# Patient Record
Sex: MALE | NOT HISPANIC OR LATINO | Employment: UNEMPLOYED | URBAN - METROPOLITAN AREA
[De-identification: names, ages, dates, MRNs, and addresses within clinical notes are randomized per-mention and may not be internally consistent; named-entity substitution may affect disease eponyms.]

---

## 2023-10-12 ENCOUNTER — HOSPITAL ENCOUNTER (EMERGENCY)
Facility: HOSPITAL | Age: 53
Discharge: HOME | End: 2023-10-12
Attending: EMERGENCY MEDICINE
Payer: COMMERCIAL

## 2023-10-12 VITALS
RESPIRATION RATE: 18 BRPM | BODY MASS INDEX: 25.9 KG/M2 | TEMPERATURE: 98.3 F | OXYGEN SATURATION: 98 % | SYSTOLIC BLOOD PRESSURE: 149 MMHG | DIASTOLIC BLOOD PRESSURE: 73 MMHG | HEART RATE: 61 BPM | HEIGHT: 72 IN

## 2023-10-12 DIAGNOSIS — K43.2 VENTRAL INCISIONAL HERNIA: Primary | ICD-10-CM

## 2023-10-12 DIAGNOSIS — L98.491: ICD-10-CM

## 2023-10-12 PROCEDURE — 99283 EMERGENCY DEPT VISIT LOW MDM: CPT | Performed by: EMERGENCY MEDICINE

## 2023-10-12 ASSESSMENT — COLUMBIA-SUICIDE SEVERITY RATING SCALE - C-SSRS
6. HAVE YOU EVER DONE ANYTHING, STARTED TO DO ANYTHING, OR PREPARED TO DO ANYTHING TO END YOUR LIFE?: NO
2. HAVE YOU ACTUALLY HAD ANY THOUGHTS OF KILLING YOURSELF?: NO
1. IN THE PAST MONTH, HAVE YOU WISHED YOU WERE DEAD OR WISHED YOU COULD GO TO SLEEP AND NOT WAKE UP?: NO

## 2023-10-12 NOTE — ED TRIAGE NOTES
Pt here for abdominal wound that has opened up and been bleeding. Hx of abd hernia and HBP no thinner.  Wound wrapped by ems.

## 2023-10-12 NOTE — ED PROVIDER NOTES
HPI   Chief Complaint   Patient presents with    Wound Check     Pt here for abdominal wound that has opened up and been bleeding. Hx of abd hernia and HBP no thinner.  Wound wrapped by ems.         53-year-old male past medical history of schizophrenia with complex ventral hernia secondary to multiple surgeries who presents today for bleeding from hernia.  Per patient he was at home sitting in a chair when it began to bleed.  He denies any trauma or picking at anything.  He does endorse that there is some wounds on the hernia sac and he does have occasional abdominal pain, but is not having additional pain right now.  He does state that he does follow with surgery at the [facility], but was declined to get repair by the surgeon he was following with secondary to his tobacco use.  Addition, he does state that he supposed be seeing wound care clinic, who he has not been seeing.  Denies any current IV drug use, fevers, nausea vomiting diarrhea.  Denies any numbness weakness tingling in his arms or legs.  Denies any changes in bowel or bladder.  Denies any new medications or history of bleeding disorders.  He does endorse that he lives in a home and there are multiple steps, which may be part of the reason why his wounds have not been healing.      History provided by:  EMS personnel, patient and relative   used: No                        [place] Coma Scale Score: 15                  Patient History   Past Medical History:   Diagnosis Date    Hernia, abdominal     Hypertension      No past surgical history on file.  No family history on file.  Social History     Tobacco Use    Smoking status: Every Day     Types: Cigarettes    Smokeless tobacco: Not on file   Substance Use Topics    Alcohol use: Not Currently    Drug use: Not on file       Physical Exam   ED Triage Vitals [10/12/23 1506]   Temp Heart Rate Resp BP   36.8 °C (98.3 °F) 61 18 149/73      SpO2 Temp Source Heart Rate Source Patient  Position   98 % Oral Monitor --      BP Location FiO2 (%)     -- --       Physical Exam  Constitutional:       General: He is not in acute distress.     Appearance: Normal appearance. He is not ill-appearing or diaphoretic.   HENT:      Head: Normocephalic and atraumatic.      Mouth/Throat:      Mouth: Mucous membranes are moist.      Pharynx: No oropharyngeal exudate or posterior oropharyngeal erythema.   Eyes:      General: No scleral icterus.     Extraocular Movements: Extraocular movements intact.      Conjunctiva/sclera: Conjunctivae normal.      Pupils: Pupils are equal, round, and reactive to light.   Cardiovascular:      Rate and Rhythm: Normal rate and regular rhythm.      Pulses: Normal pulses.      Heart sounds: Normal heart sounds. No murmur heard.     No gallop.   Pulmonary:      Effort: Pulmonary effort is normal. No respiratory distress.      Breath sounds: Normal breath sounds. No stridor. No wheezing, rhonchi or rales.   Abdominal:      Comments: Large oblong ventral wall hernia with pedunculated outpouching, chronic wounds without any active bleeding.  No erythema or induration, mildly tender to palpation.  At baseline per patient, no additional abdominal tenderness   Musculoskeletal:         General: No swelling, deformity or signs of injury. Normal range of motion.      Cervical back: Normal range of motion and neck supple. No tenderness.   Skin:     General: Skin is warm.      Capillary Refill: Capillary refill takes less than 2 seconds.      Coloration: Skin is not jaundiced or pale.      Findings: No erythema, lesion or rash.   Neurological:      General: No focal deficit present.      Mental Status: He is alert. Mental status is at baseline.   Psychiatric:         Mood and Affect: Mood normal.         Behavior: Behavior normal.         ED Course & MDM   Diagnoses as of 10/12/23 1752   Ventral incisional hernia   Abdominal wall ulcer, limited to breakdown of skin (CMS/[facility])       Medical  Decision Making  53-year-old male past medical history of schizophrenia and multiple abdominal surgeries resulting in complex ventral hernia who presents today for bleeding from hernia.  Patient's wounds were hemostatic upon presentation here.  I did put Surgicel on them and redressed his wound.  We will observe him for rebleeding for an hour.  If patient does not have bleeding from his wounds during that time, we will discharge him home with surgical and wound care clinic follow-ups.  While I do appreciate the risks associated with operating on tobacco users, this hernia had does likely cause a fairly significant amount of disability and he likely needs at the very least a binder and wound care.  Does not appear infected at this time, I do not feel we need to send him with antibiotics.  Patient is hemodynamically stable here and though he does have a decent amount of blood on his clothing, I do not feel that he has lost enough blood to require serial hemoglobins.  We observe the patient for 1 hour and then dressed his wound prior to discharging him home.  I did provide him with follow-up numbers for our general surgery team as well as the wound clinic given that he has had difficulty managing his wound through [facility].    Amount and/or Complexity of Data Reviewed  Independent Historian: caregiver and EMS        Procedure  Procedures     Nikko Tinsley MD  Resident  10/12/23 3347       Nikko Tinsley MD  Resident  10/12/23 4829      Attending Note:     I saw and evaluated the patient.  I personally obtained the key and critical portions of the history and physical exam or was physically present for key and critical portions performed by the resident/fellow/NICOLASA.  I reviewed the resident/fellow/NICOLASA’s documentation and discussed the patient with the resident/fellow/NICOLASA.  I agree with the resident/fellow/NICOLASA’s medical decision making as documented in the resident/fellow/NICOLASA’s note with the exception/addition of the  following:           HPI:   Delta Phoenix is a 53 year old male with history of schizophrenia and ventral hernia presenting to the ED for wound check. The patient reports chronic ventral hernia ongoing for at least 7 years, with chronic wounds to the area ongoing for several years as well.  He states that he follows with a general surgeon, Dr. Young, at [facility], but was told that due to smoking history, he was not offered surgery at that time.  He states that he was told to follow-up with wound clinic but has yet to do so.  Earlier today, he notes that he developed bleeding from his ventral abdominal wound.  He states this has intermittently occurred over the years.  He is not on anticoagulation and denies any trauma or fall.  He denies any erythema, induration, increased warmth, fevers, chills or night sweats. No purulent discharge.  No acute change in the wound otherwise. He is passing stool and flatus without difficulty.     Per chart review, seen 9/2022 and admitted for schizoaffective disorder.  He had a CT A/P 11/2022 which demonstrated extensive ventral hernia with multiple loops of nondilated small bowel.       Additional History Obtained from: See HPI        Physical Exam:  General: Grossly well-developed, awake, alert, NAD     Head: Normocephalic, no overt external signs of trauma     ENT: Mucus membranes moist, nares patent     Neck: Supple, trachea midline     Neurologic: A&Ox4, FS, TM, EOMI, PERRL, REYES x 4 with grossly symmetric strength,SILT grossly intact BUE and BLE     Cardiovascular: RRR, no MRG, pulses grossly symmetric     Respiratory: CTA B/L, no wheeze, rales or rhonchi     Abdomen: Soft, not appreciably tender, no evident rebound/guarding. Large ventral hernia, soft, non-tender to palpation. 3 superficial skin erosions over ventral aspect of the hernia extending partially through the epidermis without evidence of peritoneal involvement, the largest of which appears to be approximately 2x3 cm.  Superficial skin erosion over inferior portion of the hernia, similar in depth and dimensions.  No active bleeding.      Back: No apparent CVA TTP, no midline TLS spine TTP, stepoffs, or acute deformities     MSK: No gross bony deformities in BUE and BLE     Skin/Integumentary: Warm and dry     Psychiatric: Calm and cooperative. Normal mood and affect. No obvious signs of responding to internal stimulation. No pressured speech. No signs of disorganized thought process.  No flight of ideas. No signs of bizarre thought content.         Differential Diagnoses Considered:  See MDM below     Chronic Medical Conditions Significantly Affecting Care:  See MDM below     External Records Reviewed:  I reviewed recent and relevant outside records as noted in HPI above and MDM below.      Social Determinants of Health Significantly Affecting Care:  See HPI/MDM     Prescription Drug Consideration:  See MDM     Diagnostic testing considered:  See MDM and relevant sections        Medical Decision Making/Course:  53 year old male with history of schizophrenia and ventral hernia presenting to the ED for wound check.  The patient was afebrile, non-toxic and HDS.  He has a chronic large ventral hernia with chronic wounds which patient and daughter report have intermittently bleed over the years, and have been present for at least 7 years.  He follows with a surgeon and [facility] and reported that he is supposed to follow-up with wound clinic there.  He denies any acute pain at the site or his hernia site.  He is passing stool and flatus and there are no signs of strangulation or incarceration. The wounds are superficial in nature and do not involve deep tissue or peritoneum.  They do not appear infected at this time.  No signs of cellulitis, abscess, necrotizing fascitis, or other acute infectious etiologies.  He had no active bleeding on my evaluation.  He is not on AC.  He has no signs/symptoms of acute blood loss anemia and has  unremarkable vitals.  He was observed in the ED without any recurrence of bleeding. We discussed further observation, however, patient reported he preferred discharge and outpatient follow-up with his previously established providers.  We provided follow-up numbers for surgery and wound care clinic.  Wound care recommendations discussed.       Strict warning signs/precautions for return to the ED were discussed.  Instructed to follow-up with PCP/FP/IM clinic.  The patient was instructed to follow-up as discussed.  All available ED results from this visit were discussed. Any new prescription medications from the ED were discussed including common side effects/instructions for use. They were also instructed to return to the ED immediately if symptoms worsened, failed to improve, or if they developed any new symptoms/concerns.  An opportunity to ask questions was provided and all were addressed at that time.  The patient verbalized understanding and was in agreement with plan.           Escalation of Care:     Appropriate for: Outpatient management        Discussion of Management with Other Providers:     We discussed the patient/results with: Resident     Yousif Pennington MD  10/13/23 2127       Yousif Pennington MD  10/13/23 2128

## 2024-02-23 ENCOUNTER — APPOINTMENT (OUTPATIENT)
Dept: RADIOLOGY | Facility: HOSPITAL | Age: 54
DRG: 312 | End: 2024-02-23
Payer: MEDICARE

## 2024-02-23 ENCOUNTER — HOSPITAL ENCOUNTER (INPATIENT)
Facility: HOSPITAL | Age: 54
LOS: 5 days | Discharge: PSYCHIATRIC OR SUBSTANCE USE FACILITY | DRG: 312 | End: 2024-02-28
Attending: STUDENT IN AN ORGANIZED HEALTH CARE EDUCATION/TRAINING PROGRAM | Admitting: INTERNAL MEDICINE
Payer: MEDICARE

## 2024-02-23 ENCOUNTER — APPOINTMENT (OUTPATIENT)
Dept: CARDIOLOGY | Facility: HOSPITAL | Age: 54
DRG: 312 | End: 2024-02-23
Payer: MEDICARE

## 2024-02-23 DIAGNOSIS — R55 SYNCOPE, UNSPECIFIED SYNCOPE TYPE: ICD-10-CM

## 2024-02-23 DIAGNOSIS — S01.411A LACERATION OF RIGHT TEMPOROMANDIBULAR AREA WITHOUT FOREIGN BODY, INITIAL ENCOUNTER: ICD-10-CM

## 2024-02-23 DIAGNOSIS — R41.0 DISORIENTATION: ICD-10-CM

## 2024-02-23 DIAGNOSIS — R41.82 ALTERED MENTAL STATUS, UNSPECIFIED ALTERED MENTAL STATUS TYPE: Primary | ICD-10-CM

## 2024-02-23 DIAGNOSIS — J10.1 INFLUENZA A: ICD-10-CM
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
## 2024-02-23 DIAGNOSIS — F17.200 TOBACCO DEPENDENCE: ICD-10-CM

## 2024-02-23 LAB
ALBUMIN SERPL-MCNC: 3.2 G/DL (ref 3.5–5)
ALP BLD-CCNC: 104 U/L (ref 35–125)
ALT SERPL-CCNC: 14 U/L (ref 5–40)
AMMONIA PLAS-SCNC: 25 UMOL/L (ref 12–45)
AMPHETAMINES UR QL SCN>1000 NG/ML: NEGATIVE
ANION GAP SERPL CALC-SCNC: 6 MMOL/L
APPEARANCE UR: CLEAR
APTT PPP: 27.5 SECONDS (ref 22–32.5)
AST SERPL-CCNC: 23 U/L (ref 5–40)
BARBITURATES UR QL SCN>300 NG/ML: NEGATIVE
BASOPHILS # BLD AUTO: 0.02 X10*3/UL (ref 0–0.1)
BASOPHILS NFR BLD AUTO: 0.4 %
BENZODIAZ UR QL SCN>300 NG/ML: NEGATIVE
BILIRUB SERPL-MCNC: 0.2 MG/DL (ref 0.1–1.2)
BILIRUB UR STRIP.AUTO-MCNC: NEGATIVE MG/DL
BUN SERPL-MCNC: 9 MG/DL (ref 8–25)
BZE UR QL SCN>300 NG/ML: NEGATIVE
CALCIUM SERPL-MCNC: 9.7 MG/DL (ref 8.5–10.4)
CANNABINOIDS UR QL SCN>50 NG/ML: NEGATIVE
CHLORIDE SERPL-SCNC: 108 MMOL/L (ref 97–107)
CO2 SERPL-SCNC: 29 MMOL/L (ref 24–31)
COLOR UR: COLORLESS
CREAT SERPL-MCNC: 0.6 MG/DL (ref 0.4–1.6)
EGFRCR SERPLBLD CKD-EPI 2021: >90 ML/MIN/1.73M*2
EOSINOPHIL # BLD AUTO: 0.18 X10*3/UL (ref 0–0.7)
EOSINOPHIL NFR BLD AUTO: 3.6 %
ERYTHROCYTE [DISTWIDTH] IN BLOOD BY AUTOMATED COUNT: 16.8 % (ref 11.5–14.5)
FENTANYL+NORFENTANYL UR QL SCN: NEGATIVE
GLUCOSE BLD MANUAL STRIP-MCNC: 76 MG/DL (ref 74–99)
GLUCOSE SERPL-MCNC: 95 MG/DL (ref 65–99)
GLUCOSE UR STRIP.AUTO-MCNC: NORMAL MG/DL
HCT VFR BLD AUTO: 35.7 % (ref 41–52)
HGB BLD-MCNC: 11.2 G/DL (ref 13.5–17.5)
IMM GRANULOCYTES # BLD AUTO: 0.02 X10*3/UL (ref 0–0.7)
IMM GRANULOCYTES NFR BLD AUTO: 0.4 % (ref 0–0.9)
INR PPP: 1.3 (ref 0.9–1.2)
KETONES UR STRIP.AUTO-MCNC: NEGATIVE MG/DL
LACTATE BLDV-SCNC: 1.4 MMOL/L (ref 0.4–2)
LEUKOCYTE ESTERASE UR QL STRIP.AUTO: NEGATIVE
LYMPHOCYTES # BLD AUTO: 1.54 X10*3/UL (ref 1.2–4.8)
LYMPHOCYTES NFR BLD AUTO: 30.9 %
MAGNESIUM SERPL-MCNC: 2 MG/DL (ref 1.6–3.1)
MCH RBC QN AUTO: 30.4 PG (ref 26–34)
MCHC RBC AUTO-ENTMCNC: 31.4 G/DL (ref 32–36)
MCV RBC AUTO: 97 FL (ref 80–100)
METHADONE UR QL SCN>300 NG/ML: NEGATIVE
MONOCYTES # BLD AUTO: 0.75 X10*3/UL (ref 0.1–1)
MONOCYTES NFR BLD AUTO: 15 %
NEUTROPHILS # BLD AUTO: 2.48 X10*3/UL (ref 1.2–7.7)
NEUTROPHILS NFR BLD AUTO: 49.7 %
NITRITE UR QL STRIP.AUTO: NEGATIVE
NRBC BLD-RTO: 0 /100 WBCS (ref 0–0)
OPIATES UR QL SCN>300 NG/ML: NEGATIVE
OXYCODONE UR QL: NEGATIVE
PCP UR QL SCN>25 NG/ML: NEGATIVE
PH UR STRIP.AUTO: 7.5 [PH]
PLATELET # BLD AUTO: 144 X10*3/UL (ref 150–450)
POTASSIUM SERPL-SCNC: 4.2 MMOL/L (ref 3.4–5.1)
PROT SERPL-MCNC: 6.4 G/DL (ref 5.9–7.9)
PROT UR STRIP.AUTO-MCNC: NEGATIVE MG/DL
PROTHROMBIN TIME: 13.2 SECONDS (ref 9.3–12.7)
RBC # BLD AUTO: 3.69 X10*6/UL (ref 4.5–5.9)
RBC # UR STRIP.AUTO: NEGATIVE /UL
SODIUM SERPL-SCNC: 143 MMOL/L (ref 133–145)
SP GR UR STRIP.AUTO: 1.01
TROPONIN T SERPL-MCNC: 29 NG/L
UROBILINOGEN UR STRIP.AUTO-MCNC: NORMAL MG/DL
VALPROATE SERPL-MCNC: 32 UG/ML (ref 50–100)
WBC # BLD AUTO: 5 X10*3/UL (ref 4.4–11.3)

## 2024-02-23 PROCEDURE — 82140 ASSAY OF AMMONIA: CPT

## 2024-02-23 PROCEDURE — 2500000004 HC RX 250 GENERAL PHARMACY W/ HCPCS (ALT 636 FOR OP/ED)

## 2024-02-23 PROCEDURE — 80053 COMPREHEN METABOLIC PANEL: CPT

## 2024-02-23 PROCEDURE — 2500000004 HC RX 250 GENERAL PHARMACY W/ HCPCS (ALT 636 FOR OP/ED): Performed by: STUDENT IN AN ORGANIZED HEALTH CARE EDUCATION/TRAINING PROGRAM

## 2024-02-23 PROCEDURE — 36415 COLL VENOUS BLD VENIPUNCTURE: CPT

## 2024-02-23 PROCEDURE — 99223 1ST HOSP IP/OBS HIGH 75: CPT

## 2024-02-23 PROCEDURE — 90471 IMMUNIZATION ADMIN: CPT

## 2024-02-23 PROCEDURE — 12011 RPR F/E/E/N/L/M 2.5 CM/<: CPT

## 2024-02-23 PROCEDURE — 81003 URINALYSIS AUTO W/O SCOPE: CPT

## 2024-02-23 PROCEDURE — 71045 X-RAY EXAM CHEST 1 VIEW: CPT

## 2024-02-23 PROCEDURE — 99291 CRITICAL CARE FIRST HOUR: CPT

## 2024-02-23 PROCEDURE — 93005 ELECTROCARDIOGRAM TRACING: CPT

## 2024-02-23 PROCEDURE — 90715 TDAP VACCINE 7 YRS/> IM: CPT

## 2024-02-23 PROCEDURE — 2020000001 HC ICU ROOM DAILY

## 2024-02-23 PROCEDURE — 96372 THER/PROPH/DIAG INJ SC/IM: CPT

## 2024-02-23 PROCEDURE — 2500000005 HC RX 250 GENERAL PHARMACY W/O HCPCS

## 2024-02-23 PROCEDURE — 80164 ASSAY DIPROPYLACETIC ACD TOT: CPT

## 2024-02-23 PROCEDURE — 80307 DRUG TEST PRSMV CHEM ANLYZR: CPT | Performed by: STUDENT IN AN ORGANIZED HEALTH CARE EDUCATION/TRAINING PROGRAM

## 2024-02-23 PROCEDURE — 99222 1ST HOSP IP/OBS MODERATE 55: CPT | Performed by: SURGERY

## 2024-02-23 PROCEDURE — 84484 ASSAY OF TROPONIN QUANT: CPT

## 2024-02-23 PROCEDURE — G0390 TRAUMA RESPONS W/HOSP CRITI: HCPCS

## 2024-02-23 PROCEDURE — 70450 CT HEAD/BRAIN W/O DYE: CPT

## 2024-02-23 PROCEDURE — 83735 ASSAY OF MAGNESIUM: CPT

## 2024-02-23 PROCEDURE — 83605 ASSAY OF LACTIC ACID: CPT

## 2024-02-23 PROCEDURE — 85610 PROTHROMBIN TIME: CPT

## 2024-02-23 PROCEDURE — 85730 THROMBOPLASTIN TIME PARTIAL: CPT

## 2024-02-23 PROCEDURE — 2500000002 HC RX 250 W HCPCS SELF ADMINISTERED DRUGS (ALT 637 FOR MEDICARE OP, ALT 636 FOR OP/ED): Mod: MUE

## 2024-02-23 PROCEDURE — S4991 NICOTINE PATCH NONLEGEND: HCPCS | Performed by: INTERNAL MEDICINE

## 2024-02-23 PROCEDURE — 2500000004 HC RX 250 GENERAL PHARMACY W/ HCPCS (ALT 636 FOR OP/ED): Mod: JW | Performed by: INTERNAL MEDICINE

## 2024-02-23 PROCEDURE — 2500000002 HC RX 250 W HCPCS SELF ADMINISTERED DRUGS (ALT 637 FOR MEDICARE OP, ALT 636 FOR OP/ED): Performed by: INTERNAL MEDICINE

## 2024-02-23 PROCEDURE — 99285 EMERGENCY DEPT VISIT HI MDM: CPT | Mod: 25

## 2024-02-23 PROCEDURE — 82947 ASSAY GLUCOSE BLOOD QUANT: CPT

## 2024-02-23 PROCEDURE — 72125 CT NECK SPINE W/O DYE: CPT

## 2024-02-23 PROCEDURE — 0HQ1XZZ REPAIR FACE SKIN, EXTERNAL APPROACH: ICD-10-PCS

## 2024-02-23 PROCEDURE — 85025 COMPLETE CBC W/AUTO DIFF WBC: CPT

## 2024-02-23 RX ORDER — TALC
6 POWDER (GRAM) TOPICAL NIGHTLY
COMMUNITY

## 2024-02-23 RX ORDER — BENZTROPINE MESYLATE 1 MG/1
1 TABLET ORAL 2 TIMES DAILY
Status: DISCONTINUED | OUTPATIENT
Start: 2024-02-23 | End: 2024-02-24

## 2024-02-23 RX ORDER — ACETAMINOPHEN 325 MG/1
650 TABLET ORAL EVERY 4 HOURS PRN
Status: DISCONTINUED | OUTPATIENT
Start: 2024-02-23 | End: 2024-02-23

## 2024-02-23 RX ORDER — IBUPROFEN 200 MG
1 TABLET ORAL EVERY 24 HOURS
COMMUNITY
End: 2024-02-28 | Stop reason: HOSPADM

## 2024-02-23 RX ORDER — AMLODIPINE BESYLATE 10 MG/1
10 TABLET ORAL DAILY
Status: DISCONTINUED | OUTPATIENT
Start: 2024-02-23 | End: 2024-02-24

## 2024-02-23 RX ORDER — IBUPROFEN 200 MG
1 TABLET ORAL EVERY 24 HOURS
Status: DISCONTINUED | OUTPATIENT
Start: 2024-02-23 | End: 2024-02-24

## 2024-02-23 RX ORDER — METOPROLOL TARTRATE 50 MG/1
50 TABLET ORAL 2 TIMES DAILY
COMMUNITY

## 2024-02-23 RX ORDER — CLOTRIMAZOLE 1 %
1 CREAM (GRAM) TOPICAL 2 TIMES DAILY
Status: DISCONTINUED | OUTPATIENT
Start: 2024-02-23 | End: 2024-02-24

## 2024-02-23 RX ORDER — LABETALOL HYDROCHLORIDE 5 MG/ML
10 INJECTION, SOLUTION INTRAVENOUS EVERY 4 HOURS PRN
Status: DISCONTINUED | OUTPATIENT
Start: 2024-02-23 | End: 2024-02-28 | Stop reason: HOSPADM

## 2024-02-23 RX ORDER — HALOPERIDOL 5 MG/ML
5 INJECTION INTRAMUSCULAR ONCE
Status: COMPLETED | OUTPATIENT
Start: 2024-02-23 | End: 2024-02-23

## 2024-02-23 RX ORDER — ACETAMINOPHEN 160 MG/5ML
650 SOLUTION ORAL EVERY 4 HOURS PRN
Status: DISCONTINUED | OUTPATIENT
Start: 2024-02-23 | End: 2024-02-23

## 2024-02-23 RX ORDER — GUAIFENESIN 600 MG/1
600 TABLET, EXTENDED RELEASE ORAL EVERY 12 HOURS PRN
Status: DISCONTINUED | OUTPATIENT
Start: 2024-02-23 | End: 2024-02-24

## 2024-02-23 RX ORDER — BENZTROPINE MESYLATE 1 MG/1
1 TABLET ORAL 2 TIMES DAILY
COMMUNITY
End: 2024-02-28 | Stop reason: HOSPADM

## 2024-02-23 RX ORDER — HALOPERIDOL 5 MG/ML
5 INJECTION INTRAMUSCULAR EVERY 6 HOURS PRN
Status: DISCONTINUED | OUTPATIENT
Start: 2024-02-23 | End: 2024-02-23

## 2024-02-23 RX ORDER — VALPROIC ACID 250 MG/5ML
1000 SOLUTION ORAL 2 TIMES DAILY
Status: DISCONTINUED | OUTPATIENT
Start: 2024-02-23 | End: 2024-02-24

## 2024-02-23 RX ORDER — CLOTRIMAZOLE 1 %
1 CREAM (GRAM) TOPICAL 2 TIMES DAILY
COMMUNITY
End: 2024-02-28 | Stop reason: HOSPADM

## 2024-02-23 RX ORDER — ACETAMINOPHEN 325 MG/1
975 TABLET ORAL EVERY 6 HOURS PRN
Status: DISCONTINUED | OUTPATIENT
Start: 2024-02-23 | End: 2024-02-28 | Stop reason: HOSPADM

## 2024-02-23 RX ORDER — LIDOCAINE HYDROCHLORIDE AND EPINEPHRINE 10; 10 MG/ML; UG/ML
10 INJECTION, SOLUTION INFILTRATION; PERINEURAL ONCE
Status: COMPLETED | OUTPATIENT
Start: 2024-02-23 | End: 2024-02-23

## 2024-02-23 RX ORDER — VALPROIC ACID 250 MG/5ML
1000 SOLUTION ORAL 2 TIMES DAILY
COMMUNITY
End: 2024-02-28 | Stop reason: HOSPADM

## 2024-02-23 RX ORDER — KETOROLAC TROMETHAMINE 30 MG/ML
30 INJECTION, SOLUTION INTRAMUSCULAR; INTRAVENOUS EVERY 6 HOURS PRN
Status: DISCONTINUED | OUTPATIENT
Start: 2024-02-23 | End: 2024-02-25

## 2024-02-23 RX ORDER — HALOPERIDOL 5 MG/ML
2 INJECTION INTRAMUSCULAR EVERY 4 HOURS PRN
Status: DISCONTINUED | OUTPATIENT
Start: 2024-02-23 | End: 2024-02-23

## 2024-02-23 RX ORDER — RISPERIDONE 1 MG/1
1 TABLET ORAL 2 TIMES DAILY
Status: DISCONTINUED | OUTPATIENT
Start: 2024-02-23 | End: 2024-02-28 | Stop reason: HOSPADM

## 2024-02-23 RX ORDER — ACETAMINOPHEN 650 MG/1
650 SUPPOSITORY RECTAL EVERY 4 HOURS PRN
Status: DISCONTINUED | OUTPATIENT
Start: 2024-02-23 | End: 2024-02-23

## 2024-02-23 RX ORDER — AMLODIPINE BESYLATE 5 MG/1
10 TABLET ORAL DAILY
COMMUNITY

## 2024-02-23 RX ORDER — HALOPERIDOL 5 MG/1
10 TABLET ORAL 2 TIMES DAILY
Status: DISCONTINUED | OUTPATIENT
Start: 2024-02-23 | End: 2024-02-24

## 2024-02-23 RX ORDER — TALC
6 POWDER (GRAM) TOPICAL NIGHTLY
Status: DISCONTINUED | OUTPATIENT
Start: 2024-02-23 | End: 2024-02-24

## 2024-02-23 RX ORDER — DIPHENHYDRAMINE HYDROCHLORIDE 50 MG/ML
25 INJECTION INTRAMUSCULAR; INTRAVENOUS EVERY 6 HOURS PRN
Status: DISCONTINUED | OUTPATIENT
Start: 2024-02-23 | End: 2024-02-28 | Stop reason: HOSPADM

## 2024-02-23 RX ORDER — HALOPERIDOL 10 MG/1
10 TABLET ORAL 2 TIMES DAILY
COMMUNITY

## 2024-02-23 RX ORDER — POLYETHYLENE GLYCOL 3350 17 G/17G
17 POWDER, FOR SOLUTION ORAL DAILY PRN
Status: DISCONTINUED | OUTPATIENT
Start: 2024-02-23 | End: 2024-02-24

## 2024-02-23 RX ORDER — OLANZAPINE 10 MG/2ML
5 INJECTION, POWDER, FOR SOLUTION INTRAMUSCULAR ONCE AS NEEDED
Status: COMPLETED | OUTPATIENT
Start: 2024-02-23 | End: 2024-02-23

## 2024-02-23 RX ORDER — HALOPERIDOL 5 MG/ML
10 INJECTION INTRAMUSCULAR 2 TIMES DAILY
Status: DISCONTINUED | OUTPATIENT
Start: 2024-02-23 | End: 2024-02-28 | Stop reason: HOSPADM

## 2024-02-23 RX ORDER — GUAIFENESIN/DEXTROMETHORPHAN 100-10MG/5
5 SYRUP ORAL EVERY 4 HOURS PRN
Status: DISCONTINUED | OUTPATIENT
Start: 2024-02-23 | End: 2024-02-24

## 2024-02-23 RX ORDER — DIPHENHYDRAMINE HYDROCHLORIDE 50 MG/ML
25 INJECTION INTRAMUSCULAR; INTRAVENOUS ONCE
Status: COMPLETED | OUTPATIENT
Start: 2024-02-23 | End: 2024-02-23

## 2024-02-23 RX ORDER — METOPROLOL TARTRATE 50 MG/1
50 TABLET ORAL 2 TIMES DAILY
Status: DISCONTINUED | OUTPATIENT
Start: 2024-02-23 | End: 2024-02-24

## 2024-02-23 RX ADMIN — HALOPERIDOL LACTATE 5 MG: 5 INJECTION, SOLUTION INTRAMUSCULAR at 16:13

## 2024-02-23 RX ADMIN — OLANZAPINE 5 MG: 10 INJECTION, POWDER, FOR SOLUTION INTRAMUSCULAR at 14:04

## 2024-02-23 RX ADMIN — HALOPERIDOL LACTATE 10 MG: 5 INJECTION, SOLUTION INTRAMUSCULAR at 21:02

## 2024-02-23 RX ADMIN — KETOROLAC TROMETHAMINE 30 MG: 30 INJECTION, SOLUTION INTRAMUSCULAR; INTRAVENOUS at 18:22

## 2024-02-23 RX ADMIN — HALOPERIDOL LACTATE 2 MG: 5 INJECTION, SOLUTION INTRAMUSCULAR at 17:06

## 2024-02-23 RX ADMIN — DIPHENHYDRAMINE HYDROCHLORIDE 25 MG: 50 INJECTION, SOLUTION INTRAMUSCULAR; INTRAVENOUS at 12:08

## 2024-02-23 RX ADMIN — LIDOCAINE HYDROCHLORIDE,EPINEPHRINE BITARTRATE 10 ML: 10; .01 INJECTION, SOLUTION INFILTRATION; PERINEURAL at 10:02

## 2024-02-23 RX ADMIN — HALOPERIDOL LACTATE 5 MG: 5 INJECTION, SOLUTION INTRAMUSCULAR at 12:08

## 2024-02-23 RX ADMIN — TETANUS TOXOID, REDUCED DIPHTHERIA TOXOID AND ACELLULAR PERTUSSIS VACCINE, ADSORBED 0.5 ML: 5; 2.5; 8; 8; 2.5 SUSPENSION INTRAMUSCULAR at 10:02

## 2024-02-23 RX ADMIN — RISPERIDONE 1 MG: 1 TABLET, FILM COATED ORAL at 21:04

## 2024-02-23 RX ADMIN — NICOTINE 1 PATCH: 21 PATCH, EXTENDED RELEASE TRANSDERMAL at 14:01

## 2024-02-23 RX ADMIN — DEXTROSE MONOHYDRATE 500 MG: 50 INJECTION, SOLUTION INTRAVENOUS at 21:03

## 2024-02-23 ASSESSMENT — ENCOUNTER SYMPTOMS
NEUROLOGICAL NEGATIVE: 1
HEMATOLOGIC/LYMPHATIC NEGATIVE: 1
EYES NEGATIVE: 1
MUSCULOSKELETAL NEGATIVE: 1
CONSTITUTIONAL NEGATIVE: 1
ENDOCRINE NEGATIVE: 1
CARDIOVASCULAR NEGATIVE: 1
PSYCHIATRIC NEGATIVE: 1
GASTROINTESTINAL NEGATIVE: 1
RESPIRATORY NEGATIVE: 1

## 2024-02-23 ASSESSMENT — PAIN - FUNCTIONAL ASSESSMENT
PAIN_FUNCTIONAL_ASSESSMENT: FLACC (FACE, LEGS, ACTIVITY, CRY, CONSOLABILITY)
PAIN_FUNCTIONAL_ASSESSMENT: FLACC (FACE, LEGS, ACTIVITY, CRY, CONSOLABILITY)
PAIN_FUNCTIONAL_ASSESSMENT: UNABLE TO SELF-REPORT
PAIN_FUNCTIONAL_ASSESSMENT: 0-10
PAIN_FUNCTIONAL_ASSESSMENT: UNABLE TO SELF-REPORT
PAIN_FUNCTIONAL_ASSESSMENT: 0-10

## 2024-02-23 ASSESSMENT — COGNITIVE AND FUNCTIONAL STATUS - GENERAL
PATIENT BASELINE BEDBOUND: NO
DAILY ACTIVITIY SCORE: 24
MOBILITY SCORE: 24

## 2024-02-23 ASSESSMENT — PAIN SCALES - GENERAL
PAINLEVEL_OUTOF10: 10 - WORST POSSIBLE PAIN
PAINLEVEL_OUTOF10: 0 - NO PAIN
PAINLEVEL_OUTOF10: 0 - NO PAIN
PAINLEVEL_OUTOF10: 2

## 2024-02-23 ASSESSMENT — ACTIVITIES OF DAILY LIVING (ADL)
JUDGMENT_ADEQUATE_SAFELY_COMPLETE_DAILY_ACTIVITIES: NO
ASSISTIVE_DEVICE: OTHER (COMMENT)
PATIENT'S MEMORY ADEQUATE TO SAFELY COMPLETE DAILY ACTIVITIES?: UNABLE TO ASSESS
FEEDING YOURSELF: UNABLE TO ASSESS
GROOMING: UNABLE TO ASSESS
WALKS IN HOME: UNABLE TO ASSESS
TOILETING: UNABLE TO ASSESS
HEARING - LEFT EAR: FUNCTIONAL
ADEQUATE_TO_COMPLETE_ADL: YES
DRESSING YOURSELF: UNABLE TO ASSESS
BATHING: UNABLE TO ASSESS
HEARING - RIGHT EAR: FUNCTIONAL

## 2024-02-23 ASSESSMENT — PAIN DESCRIPTION - LOCATION: LOCATION: BACK

## 2024-02-23 ASSESSMENT — LIFESTYLE VARIABLES
EVER FELT BAD OR GUILTY ABOUT YOUR DRINKING: NO
HAVE PEOPLE ANNOYED YOU BY CRITICIZING YOUR DRINKING: NO
EVER HAD A DRINK FIRST THING IN THE MORNING TO STEADY YOUR NERVES TO GET RID OF A HANGOVER: NO
HAVE YOU EVER FELT YOU SHOULD CUT DOWN ON YOUR DRINKING: NO

## 2024-02-23 NOTE — CONSULTS
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 0.0 0.0 - 0.0 /100 WBCs    RBC 3.69 (L) 4.50 - 5.90 x10*6/uL    Hemoglobin 11.2 (L) 13.5 - 17.5 g/dL    Hematocrit 35.7 (L) 41.0 - 52.0 %    MCV 97 80 - 100 fL    MCH 30.4 26.0 - 34.0 pg    MCHC 31.4 (L) 32.0 - 36.0 g/dL    RDW 16.8 (H) 11.5 - 14.5 %    Platelets 144 (L) 150 - 450 x10*3/uL    Neutrophils % 49.7 40.0 - 80.0 %    Immature Granulocytes %, Automated 0.4 0.0 - 0.9 %    Lymphocytes % 30.9 13.0 - 44.0 %    Monocytes % 15.0 2.0 - 10.0 %    Eosinophils % 3.6 0.0 - 6.0 %    Basophils % 0.4 0.0 - 2.0 %    Neutrophils Absolute 2.48 1.20 - 7.70 x10*3/uL    Immature Granulocytes Absolute, Automated 0.02 0.00 - 0.70 x10*3/uL    Lymphocytes Absolute 1.54 1.20 - 4.80 x10*3/uL    Monocytes Absolute 0.75 0.10 - 1.00 x10*3/uL    Eosinophils Absolute 0.18 0.00 - 0.70 x10*3/uL    Basophils Absolute 0.02 0.00 - 0.10 x10*3/uL   Comprehensive metabolic panel   Result Value Ref Range    Glucose 95 65 - 99 mg/dL    Sodium 143 133 - 145 mmol/L    Potassium 4.2 3.4 - 5.1 mmol/L    Chloride 108 (H) 97 - 107 mmol/L    Bicarbonate 29 24 - 31 mmol/L    Urea Nitrogen 9 8 - 25 mg/dL    Creatinine 0.60 0.40 - 1.60 mg/dL    eGFR >90 >60 mL/min/1.73m*2    Calcium 9.7 8.5 - 10.4 mg/dL    Albumin 3.2 (L) 3.5 - 5.0 g/dL    Alkaline Phosphatase 104 35 - 125 U/L    Total Protein 6.4 5.9 - 7.9 g/dL    AST 23 5 - 40  U/L    Bilirubin, Total 0.2 0.1 - 1.2 mg/dL    ALT 14 5 - 40 U/L    Anion Gap 6 <=19 mmol/L   Magnesium   Result Value Ref Range    Magnesium 2.00 1.60 - 3.10 mg/dL   BLOOD GAS LACTIC ACID, VENOUS   Result Value Ref Range    POCT Lactate, Venous 1.4 0.4 - 2.0 mmol/L   ECG 12 lead   Result Value Ref Range    Ventricular Rate 59 BPM    Atrial Rate 59 BPM    AL Interval 132 ms    QRS Duration 82 ms    QT Interval 404 ms    QTC Calculation(Bazett) 399 ms    P Axis 51 degrees    R Axis 60 degrees    T Axis 17 degrees    QRS Count 10 beats    Q Onset 221 ms    P Onset 155 ms    P Offset 207 ms    T Offset 423 ms    QTC Fredericia 401 ms   Urinalysis with Reflex Culture and Microscopic   Result Value Ref Range    Color, Urine Colorless (N) Light-Yellow, Yellow, Dark-Yellow    Appearance, Urine Clear Clear    Specific Gravity, Urine 1.007 1.005 - 1.035    pH, Urine 7.5 5.0, 5.5, 6.0, 6.5, 7.0, 7.5, 8.0    Protein, Urine NEGATIVE NEGATIVE, 10 (TRACE), 20 (TRACE) mg/dL    Glucose, Urine Normal Normal mg/dL    Blood, Urine NEGATIVE NEGATIVE    Ketones, Urine NEGATIVE NEGATIVE mg/dL    Bilirubin, Urine NEGATIVE NEGATIVE    Urobilinogen, Urine Normal Normal mg/dL    Nitrite, Urine NEGATIVE NEGATIVE    Leukocyte Esterase, Urine NEGATIVE NEGATIVE   Drug Screen, Urine   Result Value Ref Range    Amphetamine Screen, Urine Negative      Barbiturate Screen, Urine Negative      Benzodiazepines Screen, Urine Negative      Cannabinoid Screen, Urine Negative      Cocaine Metabolite Screen, Urine Negative      Fentanyl Screen, Urine Negative       Methadone Screen, Urine Negative      Opiate Screen, Urine Negative      Oxycodone Screen, Urine Negative      PCP Screen, Urine Negative     Ammonia   Result Value Ref Range    Ammonia 25 12 - 45 umol/L   aPTT   Result Value Ref Range    aPTT 27.5 22.0 - 32.5 seconds   Protime-INR   Result Value Ref Range    Protime 13.2 (H) 9.3 - 12.7 seconds    INR 1.3 (H) 0.9 - 1.2      Lab Results    Component Value Date    HGBA1C 5.7 (H) 02/08/2024      ECG 12 lead    Result Date: 2/23/2024  Sinus bradycardia Possible Left atrial enlargement Borderline ECG When compared with ECG of 22-JUL-2023 19:50, Previous ECG has undetermined rhythm, needs review T wave inversion now evident in Anterior leads    CT cervical spine wo IV contrast    Result Date: 2/23/2024  Interpreted By:  Tristin Hernandez, STUDY: CT CERVICAL SPINE WO IV CONTRAST; 2/23/2024 8:57 am   INDICATION: Signs/Symptoms:fall hit head;   COMPARISON: None   ACCESSION NUMBER(S): [id number]   ORDERING CLINICIAN: ADRIANA HERNANDEZ   TECHNIQUE: Contiguous axial images were acquired from the skull base to the lung apices. Coronal and sagittal reformatted images were obtained. All CT examinations are performed with 1 or more of the following dose reduction techniques: Automated exposure control, adjustment of mA and/or kv according to patient's size, or use of iterative reconstruction techniques.   FINDINGS: There is straightening of the normal cervical lordosis.  No acute fracture or spondylolisthesis is identified. Facet degenerative changes and uncovertebral joint degenerative changes are present.     The occipital condyles, arch of C1, and the odontoid processes are intact. The atlantoaxial relationship is well maintained.   Mild-moderate disc space narrowing at C2-3 C3-4 and C4-5. Moderate-severe disc space narrowing at C5-6 and C6-7. No evidence for high-grade bony spinal canal stenosis. However there is multilevel high-grade bilateral neural foramina stenosis.         1. No acute fracture or spondylolisthesis. 2. Degenerative disc disease and spondylosis as described in the body of the report.     Signed by: Tristin Hernandez 2/23/2024 9:32 AM Dictation workstation:   [id number]    CT head wo IV contrast    Result Date: 2/23/2024  Interpreted By:  Tristin Hernandez, STUDY: ADRIANA HERNANDEZ; 2/23/2024 8:57 am   INDICATION: Signs/Symptoms:fall altered;    COMPARISON: None   ACCESSION NUMBER(S): [id number]   ORDERING CLINICIAN: ADRIANA HERNANDEZ   TECHNIQUE: Contiguous axial images were acquired from the vertex through the posterior fossa without IV contrast. All CT examinations are performed with 1 or more of the following dose reduction techniques: Automated exposure control, adjustment of mA and/or kv according to patient's size, or use of iterative reconstruction techniques.   FINDINGS: Small subcutaneous hematoma in the right periorbital location without underlying bony abnormality. No focal mass effect or midline shift is identified. The ventricles and sulci are symmetric and appropriate for the patient's age.   Mild-moderate degree of nonspecific white matter change most consistent with chronic small-vessel ischemic disease the gray white matter differentiation is preserved.   No acute intracranial hemorrhage is seen. No intra-axial or extra-axial fluid collection is seen.   The visualized paranasal sinuses and mastoid air cells are clear.       No CT evidence for acute intracranial pathology.   Signed by: Tristin Hernandez 2/23/2024 9:30 AM Dictation workstation:   [id number]    XR chest 1 view    Result Date: 2/23/2024  Interpreted By:  Tristin Hernandez, STUDY: XR CHEST 1 VIEW; 2/23/2024 9:08 am   INDICATION: Signs/Symptoms:fall.   COMPARISON: 02/18/2022   ACCESSION NUMBER(S): [id number]   ORDERING CLINICIAN: ADRIANA HERNANDEZ   TECHNIQUE: 1 view of the chest was performed.   FINDINGS: The lungs are adequately inflated. No acute consolidation. No pleural effusion. No pneumothorax.  The cardiomediastinal silhouette is within normal limits.       No acute cardiopulmonary disease.   Signed by: Tristin Hernandez 2/23/2024 9:25 AM Dictation workstation:   [id number]    XR abdomen 1 view    Result Date: 2/14/2024  * * *Final Report* * * DATE OF EXAM: Feb 14 2024  9:22AM   S5X   5289  -  XR ABDOMEN 1V SUPINE  / ACCESSION #  [id number] PROCEDURE REASON: abd pain      * *  * * Physician Interpretation * * * *  ABDOMEN X-RAY TECHNIQUE: Supine view, (2 images) COMPARISON: 01/30/2024 INDICATION:  Abdominal pain RESULT: No dilated bowel loops.  Gas and appropriate volume of formed stool are seen throughout normal caliber colon to the rectum.  Metallic clips, coils, and sutures project over the abdomen. -    IMPRESSION: Nonobstructive bowel gas pattern. : [facility]   Transcribe Date/Time: Feb 14 2024  9:32A Dictated by : MACIEL FLORES MD This examination was interpreted and the report reviewed and electronically signed by: MACIEL FLORES MD on Feb 14 2024  9:33AM  EST    XR abdomen 2 views supine and erect or decub    Result Date: 2/14/2024  * * *Final Report* * * DATE OF EXAM: Feb 14 2024  9:22AM   S5X   5289  -  XR ABDOMEN 1V SUPINE  / ACCESSION #  [id number] PROCEDURE REASON: abd pain      * * * * Physician Interpretation * * * *  ABDOMEN X-RAY TECHNIQUE: Supine view, (2 images) COMPARISON: 01/30/2024 INDICATION:  Abdominal pain RESULT: No dilated bowel loops.  Gas and appropriate volume of formed stool are seen throughout normal caliber colon to the rectum.  Metallic clips, coils, and sutures project over the abdomen. -    IMPRESSION: Nonobstructive bowel gas pattern. : [facility]   Transcribe Date/Time: Feb 14 2024  9:32A Dictated by : MACIEL FLORES MD This examination was interpreted and the report reviewed and electronically signed by: MACIEL FLORES MD on Feb 14 2024  9:33AM  EST    XR chest 1 view    Result Date: 1/30/2024  * * *Final Report* * * DATE OF EXAM: Jan 30 2024 12:16PM   [facility]   [id number]  -  XR CHEST 1V FRONTAL PORT  / ACCESSION #  [id number] PROCEDURE REASON: Pulmonary Edema      * * * * Physician Interpretation * * * *  EXAMINATION:  CHEST RADIOGRAPH (PORTABLE SINGLE VIEW AP) Exam Date/Time:  1/30/2024 12:16 PM Clinical History: Pulmonary Edema MQ:  XCPMC_6 Comparison:  2 days prior RESULT: Lines, tubes, and devices:  Interval removal of nasogastric tube.   LEFT PICC terminates at the superior cavoatrial junction. Lungs and pleura:  Lung volumes remain low.  Mild interstitial opacities are in both lungs, LEFT worse than RIGHT with bibasilar atelectasis and trace LEFT pleural effusion.  No substantial pneumothorax is identified. Cardiomediastinal silhouette:  The cardiomediastinal silhouette is stable since the prior exam. Other:    IMPRESSION: See result : [facility]   Transcribe Date/Time: Jan 30 2024 12:29P Dictated by : JUSTIN SNYDER MD This examination was interpreted and the report reviewed and electronically signed by: JUSTIN SNYDER MD on Jan 30 2024 12:30PM  EST    XR abdomen 2 views supine and erect or decub    Result Date: 1/30/2024  * * *Final Report* * * DATE OF EXAM: Jan 30 2024  5:37AM   [facility]   5289  -  XR ABDOMEN 1V SUPINE  / ACCESSION #  [id number] PROCEDURE REASON: Ileus vs obstruction      * * * * Physician Interpretation * * * *  PORTABLE SUPINE ABDOMEN RADIOGRAPH  1/30/2024 5:37 AM HISTORY: Ileus TECHNIQUE:  2 supine abdominal image(s).    IMPRESSION: Scattered gas in nondilated colon and small bowel.   Herniorrhaphy mesh overlies the left abdomen.  Rectal management device in lower midline pelvis.  Nasogastric tube has been removed. : [facility]   Transcribe Date/Time: Jan 30 2024  7:49A Dictated by : AIDEE OSBORNE MD This examination was interpreted and the report reviewed and electronically signed by: AIDEE OSBORNE MD on Jan 30 2024  7:57AM  EST    XR abdomen 2 views supine and erect or decub    Result Date: 1/29/2024  * * *Final Report* * * DATE OF EXAM: Jan 29 2024  6:38AM   [facility]   5289  -  XR ABDOMEN 1V SUPINE  / ACCESSION #  [id number] PROCEDURE REASON: Bowel obstruction suspected      * * * * Physician Interpretation * * * *  ABDOMEN PORTABLE 01/29/2024 6:22 AM HISTORY: Bowel obstruction suspected. TECHNIQUE: Single View, Supine Abdomen; Number of Images: 1 with additional postprocessing COMPARISON: KUB 01/28/2024 RESULT: See  impression.    IMPRESSION: NG/OG tube terminating in the gastric body, side port below the level of the diaphragm.  Gas in nondilated loops of both small and large bowel.   Herniorrhaphy mesh tacks over the LEFT abdomen.  Midline and right lateral abdominal abdominal surgical clips. : [facility]   Transcribe Date/Time: Jan 29 2024  7:30A Dictated by : CRISTIANE DURAN This examination was interpreted and the report reviewed and electronically signed by: KATHY JACK MD on Jan 29 2024  7:34AM  EST    XR chest 1 view    Result Date: 1/28/2024  * * *Final Report* * * DATE OF EXAM: Jan 28 2024  5:00PM   [facility]   5376  -  XR CHEST 1V FRONTAL PORT  / ACCESSION #  [id number] PROCEDURE REASON: Cough      * * * * Physician Interpretation * * * *  EXAMINATION:  CHEST RADIOGRAPH (PORTABLE SINGLE VIEW AP) Exam Date/Time:  1/28/2024 5:00 PM Clinical History: Cough, Shortness of breath MQ:  XCPMC_6 Comparison:  1 day prior RESULT: Lines, tubes, and devices:  The tip of a left PICC terminates likely in the right atrium.  An NG/OG tube extends below the diaphragm.  The tip of an endotracheal tube is obscured although appears to be above the petr. Lungs and pleura:  Stable right hemidiaphragm elevation.  There is likely mild atelectasis at the lung bases.  There may be small pleural effusions.  The left lung apex is partially obscured by an overlying device.  No pulmonary edema or substantial pneumothorax. Cardiomediastinal silhouette:  Stable cardiomediastinal silhouette. Other:  .    IMPRESSION: See result. : [facility]   Transcribe Date/Time: Jan 28 2024  6:00P Dictated by : VERONICA MORIN MD This examination was interpreted and the report reviewed and electronically signed by: VERONICA MORIN MD on Jan 28 2024  6:02PM  EST    XR abdomen 2 views supine and erect or decub    Result Date: 1/28/2024  * * *Final Report* * * DATE OF EXAM: Jan 28 2024 10:38AM   [facility]   5289  -  XR ABDOMEN 1V SUPINE  / ACCESSION #   [id number] PROCEDURE REASON: Abdominal distension      * * * * Physician Interpretation * * * *  PLAIN FILM OF THE ABDOMEN CLINICAL INFORMATION: Abdominal distension. DATE: 1/28/2024 at 10:29 AM TECHNIQUE: Supine  abdomen, 1 view(s); 2 images RESULT: see impression.    IMPRESSION: NG/OG tube with tip and side-port in the gastric body/antrum. No gas-filled dilated bowel loops. : [facility]   Transcribe Date/Time: Jan 28 2024  2:31P Dictated by : BERNICE ESQUIVEL MD This examination was interpreted and the report reviewed and electronically signed by: BERNICE ESQUIVEL MD on Jan 28 2024  2:33PM  EST    XR chest 1 view    Result Date: 1/27/2024  * * *Final Report* * * DATE OF EXAM: Jan 27 2024  3:50PM   [facility]   [id number]  -  XR CHEST 1V FRONTAL PORT  / ACCESSION #  [id number] PROCEDURE REASON: Hypoxemia      * * * * Physician Interpretation * * * *  EXAMINATION:  CHEST RADIOGRAPH (PORTABLE SINGLE VIEW AP) Exam Date/Time:  1/27/2024 3:50 PM Clinical History: Hypoxemia MQ:  XCPMC_6 Comparison:  2 days prior RESULT: Lines, tubes, and devices:  The malpositioned right PICC present on the prior exam has been removed.  Interval placement of a left PICC with the tip near the superior cavoatrial junction.  The tip of an endotracheal tube is above the petr.  An NG/OG tube extends below the diaphragm. Lungs and pleura:  Improved inspiration.  Hazy opacities at the lung bases are probably due to small pleural effusions with adjacent atelectasis although superimposed pneumonia/aspiration is possible.   Bilateral reticular opacities, left greater than right, are suggestive of vascular congestion/mild pulmonary edema.  No substantial pneumothorax. Cardiomediastinal silhouette:  Stable borderline enlarged cardiomediastinal silhouette. Other:  .    IMPRESSION: See result. : [employer]   Transcribe Date/Time: Jan 27 2024  3:53P Dictated by : VERONICA MORIN MD This examination was interpreted and the report reviewed and  electronically signed by: VERONICA MORIN MD on Jan 27 2024  3:56PM  EST    XR abdomen 2 views supine and erect or decub    Result Date: 1/27/2024  * * *Final Report* * * DATE OF EXAM: Jan 27 2024  7:03AM   [facility]   [id number]  -  XR ABDOMEN 1V SUPINE  / ACCESSION #  [id number] PROCEDURE REASON: Abdominal distension      * * * * Physician Interpretation * * * *  EXAM: KUB ABDOMEN. CLINICAL INFORMATION: Abdominal distention TECHNIQUE: Supine abdomen, 2 image(s) COMPARISON: 01/25/2024 RESULT:  See Impression.    IMPRESSION: Support lines and tubes:  Nasogastric tube, with its tip in the midgastric body, its sidehole is subdiaphragmatic. Bowel gas pattern: Gas-filled normal-caliber small and large bowel loops. Calcifications: Hernia repair tacks present in the midabdomen.  Midline surgical clips Free Gas: cannot be assessed on this supine exam. : [facility]   Transcribe Date/Time: Jan 27 2024  8:45A Dictated by : TRAVIS RYAN MD This examination was interpreted and the report reviewed and electronically signed by: TRAVIS RYAN MD on Jan 27 2024  8:47AM  EST    IR PHYSICIAN PICC INSERTION RADIO    Result Date: 1/26/2024  * * *Final Report* * * DATE OF EXAM: Jan 26 2024 12:03PM   [facility]   [id number]  -  IR PICC INSERT PHYSICIAN  / ACCESSION #  [id number] PROCEDURE REASON: Difficult intravenous access      * * * * Physician Interpretation * * * *  PROCEDURE: PERIPHERALLY INSERTED CENTRAL CATHETER (PICC) PLACEMENT Procedural Personnel Attending physician(s): Aspen Martinez MD Fellow physician(s): None Resident physician(s): None Advanced practice provider(s): CRISTIANE Valentin Medical Student(s): None Pre-procedure diagnosis: Double-lumen PICC line insertion Post-procedure diagnosis: Same Indication: TPN/additional IV access Additional clinical history: 54 y/o male with history of HTN, schizophrenia presenting?with ulceration of abdominal skin progressing to evisceration of bowel.  He presents to IR for failed right  basilic/brachial vein PICC line insertion per PICC team.  Double-lumen PICC line insertion requested for administration of TPN as well as additional IV access. _______________________________________________________________ PROCEDURE SUMMARY: - Venous access with ultrasound guidance - PICC insertion with fluoroscopic guidance - Additional procedure(s): None PROCEDURE DETAILS: Pre-procedure Consent: Risks, benefits, treatment options, potential complications and personnel to be involved were discussed (including the risks of radiation exposure, contrast and anesthesia administration, and any equipment needed for the procedure to ensure best possible outcome) with the patient and all questions were answered and consent was obtained prior to procedure. Premedicated for contrast allergy: n/a Transfusion of blood products: No Medication reconciliation: The patient's medications and allergies were reviewed in the electronic medical record and reconciled to the proposed procedure/treatment. Vira-procedure discussion: The appropriate elements of the pre-procedure discussion, safety check list and sign-out were performed. Time out: A time out was performed immediately prior to procedure start with the nursing and interventional team, correctly identifying the name, date of birth, procedure, anatomy (including marking of site and side if applicable), patient position, procedure consent form, relevant diagnostic and radiology test results, antibiotic administration if applicable, safety precautions, and procedure-specific equipment needs. Start of procedure: 1139 End of procedure: 1203 Patient position:  Supine Preparation (MIPS): The site was prepared and draped using all elements of maximal sterile barrier technique including sterile gloves, sterile gown, cap, mask, large sterile sheet, sterile ultrasound probe cover, hand hygiene and cutaneous antisepsis with 2% chlorhexidine. Medical reason for site preparation exception  (MIPS): Not applicable Antibiotics: None Antibiotic infusion start time: N/A Prophylactic antibiotic administered: None Additional med:  None Additional med:  None Contrast Contrast agent: None Contrast volume (mL): 0 Image Guidance:  Fluoroscopic and sonographic guidance with digital image storage Radiation Dose FLUOROSCOPIC RADIATION SUMMARY: Plane A, Air Kerma:  5.8mGy Dose Area Product (DAP):   151.30vLno4 Fluoro Time:  3.0 min:sec Radiation dose exceed 5 Gy: No If radiation dose exceeded 5 Gy, was counseling and instructional brochure provided:  N/A Anesthesia/sedation Level of anesthesia/sedation: No sedation Anesthesia/sedation administered by: Not applicable Total intra-service sedation time (minutes): 0 Local anesthesia: 2 % lidocaine Access Local anesthesia was administered. The vessel was sonographically evaluated and determined to be patent. Real time ultrasound was used to visualize needle entry into the vessel and a permanent image was stored. Vein accessed: Basilic vein Access technique: Micropuncture set with 21 gauge needle Venography Indication for venography: None Vein catheterized: Not applicable Findings: Not applicable Catheter placement The catheter was trimmed to appropriate length and placed into the vein under fluoroscopic guidance via a peel-away sheath. Catheter tip location was fluoroscopically verified and a permanent image was stored. A sterile dressing was applied. Catheter placed: 2L Power PICC-line Catheter size (Swazi): 5 Catheter intravascular length (cm): 51 Catheter flush: Normal saline Catheter securement technique: Stat-Lock Additional Details Additional description of procedure: None Equipment details: None Specimens removed: None Estimated blood loss (mL): Less than 10 Standardized report: [id number] Complications There were no immediate complications and no other complications. Conclusion The patient was comfortable and was transferred to the regular nursing floor in  stable condition. The procedure was performed by the: the assistant, and the attending radiologist was present for all critical and key portions of the procedure, and was immediately available to furnish services during the entire procedure. The attending radiologist performed the following procedural activities: Preprocedural planning.    IMPRESSION: INSERTION OF LEFT-SIDED DUAL-LUMEN POWER-INJECTABLE PICC, WITH TIP IN THE EXPECTED LOCATION OF THE RIGHT ATRIUM. Plan: The catheter may be used immediately. Attestation Signer name: Aspen Martinez MD I attest that I was present for the entire procedure. I reviewed the stored images and agree with the report as written. : [facility]   Transcribe Date/Time: Jan 26 2024 12:18P Dictated by : RT SKINNY This examination was interpreted and the report reviewed and electronically signed by: ASPEN MARTINEZ MD on Jan 26 2024  1:32PM  EST    XR chest 1 view    Result Date: 1/25/2024  * * *Final Report* * * DATE OF EXAM: Jan 25 2024  6:17PM   [facility]   [id number]  -  XR CHEST 1V PORT POST PICC -NB  / ACCESSION #  [id number] PROCEDURE REASON: Check PICC line placement      * * * * Physician Interpretation * * * *  EXAMINATION:  CHEST RADIOGRAPH (PORTABLE SINGLE VIEW AP) CLINICAL HISTORY:  Check tip position following upper extremity catheter placement  Check PICC line placement MQ:  XCPICC_3 COMPARISON:  Same day RESULT: Lines, tubes, and devices:      - Interval placement of a right upper extremity catheter; the distal aspect of the line is misdirected to the right IJ.  The actual tip is not excluded.  Repositioning is recommended.      - Other lines/tubes/devices:  ET tube postmortem satisfactory position.  NG tube remains with tip in the mid stomach. Lungs and pleura:  Increase of basilar atelectasis, left more than right.  Superimposed infiltrates/infection or edema cannot be entirely excluded.  Right hemidiaphragm elevation is again seen.  Trace pleural effusions or  pleural thickening remain. Cardiomediastinal silhouette:  Stable cardiomediastinal silhouette. Other:  .    IMPRESSION: See result. : [facility]   Transcribe Date/Time: Jan 25 2024  8:07P Dictated by : KIRAN GUZMAN MD This examination was interpreted and the report reviewed and electronically signed by: KIRAN GUZMAN MD on Jan 25 2024  8:09PM  EST    IR VASCULAR ACCESS TEAM PICC INSERTION RADIO    Result Date: 1/25/2024  Sade Mendez RN     1/25/2024  6:36 PM PICC NURSE INSERTION NOTE DATE OF PROCEDURE: January 25, 2024   TIME OF PROCEDURE: 1615 ORDERING PHYSICIAN: George Rogers DO INFORMED CONSENT: Obtained per hospital policy. INDICATION FOR LINE PLACEMENT: IV access TPN CONDITION OF LINE PLACEMENT: Sterile PRIMARY PROCEDURALIST: Margarita Perez RN ASSISTANT: Sade Mendez RN PRE-PROCEDURE REVIEW ALLERGIES No Known Allergies Known History of Upper Venous Thrombosis: No BUE SVT Known History of Permanent Pacemaker or Automated Implanted Cardiac Device: No Previous Breast Surgery of Lymph Node Dissection: No Estimated Glomerular Filtration Rate Date Value Ref Range Status 01/25/2024 107 >=60 mL/min/1.73m² Final   Comment:   Estimated Glomerular Filtration Rate (eGFR) is calculated using the 2021 CKD-EPI creatinine equation. This equation utilizes serum creatinine, sex, and age as parameters. The creatinine assay has traceable calibration to isotope dilution-mass spectrometry. Refer to KDIGO guidelines for clinical interpretation. In patients with unstable renal function, e.g. those with acute kidney injury, the eGFR may not accurately reflect actual GFR. eGFR- Date Value Ref Range Status 03/25/2020 >60 >60 mL/min/1.73 2 Final   Comment:   MDRD calculation used for eGFR results. History of Renal Disease: No Ultrasound Assessment Complete: Yes PROCEDURE NARRATIVE SAFE PRACTICE Hand Hygiene per Hospital Policy: Yes  Skin Preparation Unit Dose Applicator Used: Chloraprep (CHG  + alcohol), allowed to dry. 2% Chlorhexidine Gluconate cloth utilized preprocedure on extremity Procedure Surface Cleansed with Antimicrobial Wipes: Yes Barriers Used by Proceduralist and all Assisting Personnel: Yes UNIVERSAL PROTOCOL / SAFETY CHECKLIST Procedure to be Performed: double lumen PICC Sign In: A Moment of CARE was completed. Personnel directly involved with the procedure wore the appropriate PPE (Personal Protective Equipment). Patient/Surrogate Stated/Verified: PATIENT VERIFIED(optional for EMERGENT procedures): Patient name, Date of Birth, Relevant allergies, and The intended procedure Time Out Communication: Intended patient and procedure match the source documents. Consent documented and matches the intended procedure. Sign Out: SIGN OUT (optional for EMERGENT procedures): No specimen collected. Sade Mendez RN CATHETER PLACEMENT Brand:  [employer]                   Lot:  [id number] Number of Lumens: 2 Type of PICC: Power Injectable PICC Lumen Size: 5 Macedonian PLACEMENT TECHNIQUE Lidocaine: Yes,  Lidocaine 1%  Volume 3 mL Subcutaneous Modified Seldinger Technique Used to Place Line via the Right Brachial Ultrasound Guidance: Yes Number of Attempts at Insertion: 3 Ensured control of guidewire during all aspects of the procedure: Yes Accounted for entire guidewire upon removal: Yes Internal Length: 41 cm     External Length: 0 cm      Trim Length: 41 cm Mid-Arm Circumference: 29 centimeters Post Insertion Pain Level Related to Procedure: 0 Action Taken to Address Pain: None needed Verified Placement: Chest X-ray ordered Line was Flushed with 20 mL normal saline Line Secured with: Securement device Sterile Dressing Applied and Dated: Yes Sterile Caps on all Ports Prior to Leaving Procedure Area: Yes SPECIMENS: None COMPLICATIONS: difficulty threading GW in Rt basilic vein x2, difficulty getting PICC to drop into the SVC with multiple positioning and flushing.  Could not get 3CG image therefor CXR ordered.    Patient Education Materials: Left at bedside [facility] Central Line Insertion checklist was utilized during this procedure. QUESTIONS or PROBLEMS: Page [id number] SIGNATURE: Sade Mendez RN PATIENT NAME: Delta Phoenix DATE: January 25, 2024 MRN: [id number] TIME: 5:31 PM PAGER/CONTACT PHONE: [phone]     XR abdomen 2 views supine and erect or decub    Result Date: 1/25/2024  * * *Final Report* * * DATE OF EXAM: Jan 25 2024  4:10AM   CHESTER   [id number]  -  XR ABDOMEN 1V SUPINE  / ACCESSION #  [id number] PROCEDURE REASON: Abdominal distension      * * * * Physician Interpretation * * * *  PORTABLE SUPINE ABDOMEN RADIOGRAPH  1/25/2024 4:10 AM HISTORY: Abdominal distension. TECHNIQUE:  2 supine abdominal image(s).    IMPRESSION: As previously, diffusely gas-filled dilated colonic and small bowel loops throughout the abdomen.  Small bowel in right upper quadrant measures 4 to 5 cm, similar to prior exam. Nasogastric tube has been placed with its tip and sidehole in the fundus   . Midline herniorrhaphy mesh, bladder catheter, and oval radiopacity projecting over mid and left pelvis are all unchanged. : [employer]   Transcribe Date/Time: Jan 25 2024  7:36A Dictated by : AIDEE OSBORNE MD This examination was interpreted and the report reviewed and electronically signed by: AIDEE OSBORNE MD on Jan 25 2024  7:38AM  EST    XR chest 1 view    Result Date: 1/25/2024  * * *Final Report* * * DATE OF EXAM: Jan 25 2024  4:10AM   CHE[facility]   [id number]  -  XR CHEST 1V FRONTAL PORT  / ACCESSION #  [id number] PROCEDURE REASON: Shortness of breath      * * * * Physician Interpretation * * * *  EXAMINATION:  CHEST RADIOGRAPH (PORTABLE SINGLE VIEW AP) Exam Date/Time:  1/25/2024 4:10 AM Clinical History: Shortness of breath MQ:  XCPMC_6 Comparison:  1 day prior RESULT: Lines, tubes, and devices:  The endotracheal tube ends in the thoracic trachea.  The nasogastric/orogastric tube extends below the diaphragm. Lungs and pleura:  The lungs are  hypoinflated.  Bibasilar opacities may represent atelectasis or pneumonia/aspiration.  Small left pleural effusion is seen.  There is no pneumothorax. Cardiomediastinal silhouette:  Stable cardiomediastinal silhouette.    IMPRESSION: See result. : [employer]   Transcribe Date/Time: Jan 25 2024  7:16A Dictated by : VALENTIN GARCIA MD This examination was interpreted and the report reviewed and electronically signed by: VALENTIN GARCIA MD on Jan 25 2024  7:17AM  EST    XR abdomen 2 views supine and erect or decub    Result Date: 1/24/2024  * * *Final Report* * * DATE OF EXAM: Jan 24 2024  3:31PM   CHESTER   [id number]  -  XR ABDOMEN 1V SUPINE  / ACCESSION #  [id number] PROCEDURE REASON: Abdominal distension      * * * * Physician Interpretation * * * *  ABDOMINAL X-RAY, 1 VIEW. CLINICAL INFORMATION: Abdominal pain. CURRENT STUDY: 1/24/2024 3:31 PM COMPARISON: Abdominal radiograph 01/24/2024 TECHNIQUE: Supine abdomen, 1 image(s). RESULT: See impression.    IMPRESSION: Lines, tubes, and devices: - Coude catheter in the pelvis.  Mesh hernia markers over the mid abdomen. - Curvilinear density projecting over the left lower quadrant, similar to prior, presumably packing material or external to the patient, and discussed with clinical team. Bowel: Multiple loops of dilated small bowel up to 5-6 cm, similar to prior.  Colon is mildly dilated distended as well, measuring up to 7 cm along the hepatic flexure.  Findings likely represent ileus, overall similar to prior. Other: - Some conspicuity of bowel wall in right abdomen is likely related to overlapping bowel segments rather than true Rigler sign.  Note that detection of free intraperitoneal gas is limited given supine technique. - Degenerative changes in skeleton. COMMUNICATION:  Communicated with Nichole Santamaria MD on 1/24/2024 5:29 PM  via verbal communication. : [facility]   Transcribe Date/Time: Jan 24 2024  3:45P Dictated by : LORENZA FAM MD This  examination was interpreted and the report reviewed and electronically signed by: SALINA DING MD on Jan 24 2024  5:29PM  EST    XR chest 1 view    Result Date: 1/24/2024  * * *Final Report* * * DATE OF EXAM: Jan 24 2024  3:31PM   CHESTER   [id number]  -  XR CHEST 1V FRONTAL PORT  / ACCESSION #  [id number] PROCEDURE REASON: Shortness of breath      * * * * Physician Interpretation * * * *  EXAMINATION:  CHEST RADIOGRAPH (PORTABLE SINGLE VIEW AP) Exam Date/Time:  1/24/2024 3:31 PM Clinical History: Shortness of breath MQ:  XCPMC_6 Comparison:   EVALUATION IS LIMITED BY OVERLYING EXTERNAL SHADOWS, INCLUDING MONITORING WIRES  Lines, tubes, and devices:  None. Lungs and pleura:  Stable vague opacities with volume loss at the bases suggesting partial atelectasis with possible underlying edema inflammation.  Right hemidiaphragm remains elevated.  Decrease and/or shift of small pleural effusions. Cardiomediastinal silhouette:  Normal sized heart.  Thoracic aorta is mildly tortuous. Other:  .    IMPRESSION: See result. : [facility]   Transcribe Date/Time: Jan 24 2024  4:10P Dictated by : KIRAN GUZMAN MD This examination was interpreted and the report reviewed and electronically signed by: KIRAN GUZMAN MD on Jan 24 2024  4:11PM  EST          Active Problems:  There are no active Hospital Problems.     Assessment/Plan   Active Problems:  There are no active Hospital Problems.    Fall from standing. No significant acute traumatic injuries noted.   Sutures to be removed in 5-7 days.     Workup of neurologic status per primary/ neurology.     Trauma will sign off       America Milner MD

## 2024-02-23 NOTE — SIGNIFICANT EVENT
Respiratory was called for a limited trama.  Found patient on RA spo2 93% and bilateral BS clear.  Patient in no respiratory distress.

## 2024-02-23 NOTE — ED PROVIDER NOTES
Patient was seen by both myself and advanced practitioner.  I personally saw the patient and made/approved the management plan and take responsibility for the patient management     Patient is a 53-year-old male with a history of schizophrenia, recent incarcerated hernia that was repaired that presents emergency department for evaluation of fall, head injury on heparin and altered mental status.  Patient reportedly has been confused recently while he was at a long-term acute care facility and there was concern it may be related to his longstanding schizophrenia.  He was transferred to [facility].  He has been there for the last 3 days however today suffered a fall.  He is currently on heparin and did injure his head and was sent in for further evaluation.  They do report that he has not had a change in mentation since the fall as he seems to be more lethargic and slurring his words.  Patient denies any pain at this time.  He is confused and unable to provide any further history.    On exam patient uncomfortable appearing but in no obvious distress.  He is awake, alert and oriented.  Vital signs are stable on arrival.  Lungs are clear to auscultation bilaterally.  There is an obvious wound to the right forehead near the eyebrow.  He does appear to be slurring his words however there is no facial droop.  He does have a confused verbal response however is moving all extremities equally without obvious focal deficit.  Patient does have an NIH of 2 however history and exam more consistent with a traumatic head injury than stroke as there is no focal motor deficit and no facial droop.  Blood work ordered including CBC, CMP, lactic acid, urine direction, urinalysis.  Chest x-ray was ordered along with CT scan of the head and cervical spine.  I did discuss case with trauma surgeon who agrees with plan for imaging and I will discuss results with her once patient's workup has completed.  CT scan of the brain shows no  evidence of intracranial hemorrhage.  CT scan cervical spine shows no evidence of fracture.  Chest x-ray is clear showing no evidence of pneumonia, pneumothorax, wide mediastinum.  He does remain confused with intermittent incoherent speech.  In discussion with the patient's facility which she came from and this was a new change this morning.  Given his new incoherent speech and confusion patient will be admitted for further neurologic exam.  Case was discussed with Dr. Hernandez who is on-call for unassigned patients and he excepted patient for observation.    I independently interpreted the following study(s) EKG which show sinus bradycardia with a rate of 59 bpm, normal axis, QTc 399, no evidence of STEMI.    I personally discussed the patient's management with Dr. Milner, who stated that she agrees with imaging plan and I will discuss results with her prior to disposition.       Paul Francois, DO  02/23/24 1129

## 2024-02-23 NOTE — CONSULTS
"Inpatient consult to Psychiatry  Consult performed by: Rocio Quach, APRN-CNP  Consult ordered by: Kelvin Hernandez MD  Reason for consult: \"acute psychosis\"    PSYCHIATRY CONSULT NOTE    Visit type: Face to face evaluation     HISTORY OF PRESENT ILLNESS:     Delta Phoenix is a 53 y.o. male with a past psychiatric history of schizophrenia.  He has had a long complicated recovery after undergoing hernia repair in January (1/16/24) at [facility].  Patient experienced hyperactive delirium and was subsequently transferred to [facility] on 2/20 for schizophrenia with continued hyperactive delirium, combativeness and agitation.    Per ED note:  Patient is a 53-year-old male with a history of schizophrenia, recent incarcerated hernia that was repaired that presents emergency department for evaluation of fall, head injury on heparin and altered mental status. Patient reportedly has been confused recently while he was at a long-term acute care facility and there was concern it may be related to his longstanding schizophrenia. He was transferred to [facility]. He has been there for the last 3 days however today suffered a fall. He is currently on heparin and did injure his head and was sent in for further evaluation. They do report that he has not had a change in mentation since the fall as he seems to be more lethargic and slurring his words. Patient denies any pain at this time. He is confused and unable to provide any further history.     Pt was initially calm.  He had slurred speech and incoherent speech which according to facility is new.  Pt became agitated in the afternoon requiring multiple doses of antipsychotic medications and 4 point restraints.  He is intermittently calm and cooperative, labile becomes ramped up rather quickly.      On assessment patient is in 4 point restraints.  Speech is incoherent, he is mildly restless in bed.  He states something about not believing in mental illness, " occasional profanities, thought process is disorganized. Is able to tell me that he is in the hospital.    Called daughter for collateral information was unable to reach her.        Past Psychiatric History  Current/Previous Diagnoses:  paranoid schizophrenia  Current Psychiatrist/Provider:  Jenelle Morales  Past Medication Trials:  Per chart review and last visit on 12/28/23 with [facility]--  Invega Sustenna inj. 234mg q 28 days, Haldol 10mg bedtime, Depakote 100mg QHS  Inpatient Hospitalizations:  multiple     Substance Abuse History  Per chart review alcohol use disorder- unknown details    Social History  Household: lives [facility] from what I can obtain from chart review.    OARRS REVIEW  OARRS checked: Yes  OARRS comments: 1 rx for klonopin 1mg BID for 14 days filled May 2023    Past Medical History  He has a past medical history of Hernia, abdominal and Hypertension.      ALLERGIES  Lorazepam and Ziprasidone hcl    Surgical History  He has no past surgical history on file.    FAMILY HISTORY  No family history on file.     PSYCHIATRIC REVIEW OF SYSTEMS  Depression:  juan pablo  Anxiety: restlessness or feeling keyed up or on edge and irritability  Jaimee: negative  Psychosis: disorganized speech and disorganized thought   Delirium: disorientation, hypervigilance, speech or language disturbance, and increased or decreased psychomotor activity   Trauma: negative    OBJECTIVE:     VITALS      2/23/2024    10:05 AM 2/23/2024    10:08 AM 2/23/2024    10:11 AM 2/23/2024    10:15 AM 2/23/2024    10:30 AM 2/23/2024    11:00 AM 2/23/2024     2:08 PM   Vitals   Systolic  117   139 128 150   Diastolic  107   86 96 95   Heart Rate 64 67    64 92 68 86 80 73   Resp 18 13    13 26 13 23 16 18   Height (in)       1.829 m (6')   Weight (lb)       179.9   BMI       24.4 kg/m2   BSA (m2)       2.04 m2      Body mass index is 24.4 kg/m².  Facility age limit for growth %nupur is 20 years.  Wt Readings from Last  4 Encounters:   02/23/24 81.6 kg (179 lb 14.3 oz)   01/31/22 86.6 kg (191 lb)       Mental Status Exam  Mental Status Examination  General Appearance: Disheveled. Right eye stitched, dried blood present  Gait/Station:  not observed pt in 4 point restraints  Speech: Grossly slurred speech, almost unintelligible  Affect: Increased intensity, Labile, and Angry  Thought Process: Gross disorganization  Thought Associations: Moderate loosening of associations  Thought Content:  unable to determine  Perception: Unable to assess   Level of Consciousness: Alert  Orientation:  x2  Attention and Concentration:  impaired  Insight:  poor  Judgment: Severely impaired, as evidenced by inability to reason through medical decision making and recent high-risk or self-harming behavior       HOME MEDICATIONS  Medication Documentation Review Audit       Reviewed by Nadia Manjarrez RN (Registered Nurse) on 02/23/24 at 1221      Medication Order Taking? Sig Documenting Provider Last Dose Status   amLODIPine (Norvasc) 5 mg tablet [id number]  Take 2 tablets (10 mg) by mouth once daily. Historical Provider, MD  Active   benztropine (Cogentin) 1 mg tablet [id number]  Take 1 tablet (1 mg) by mouth 2 times a day. Historical Provider, MD  Active   clotrimazole (Lotrimin) 1 % cream [id number]  Apply 1 Application topically 2 times a day. Historical Provider, MD  Active   haloperidol (Haldol) 10 mg tablet [id number]  Take 1 tablet (10 mg) by mouth 2 times a day. Historical Provider, MD  Active   melatonin 3 mg tablet [id number]  Take 2 tablets (6 mg) by mouth once daily at bedtime. Historical Provider, MD  Active   metoprolol tartrate (Lopressor) 50 mg tablet [id number]  Take 1 tablet by mouth 2 times a day. Historical Provider, MD  Active   nicotine (Nicoderm CQ) 21 mg/24 hr patch [id number]  Place 1 patch on the skin once every 24 hours. Historical Provider, MD  Active   valproate (Depakene) 250 mg/5 mL oral solution [id number]  Take 20 mL (1,000 mg)  by mouth 2 times a day. Historical Provider, MD  Active                     CURRENT MEDICATIONS  Scheduled medications  [MAR Hold] amLODIPine, 10 mg, oral, Daily  [MAR Hold] benztropine, 1 mg, oral, BID  [MAR Hold] clotrimazole, 1 Application, Topical, BID  [MAR Hold] haloperidol, 10 mg, oral, BID  [MAR Hold] melatonin, 6 mg, oral, Nightly  [MAR Hold] metoprolol tartrate, 50 mg, oral, BID  [MAR Hold] nicotine, 1 patch, transdermal, q24h  [MAR Hold] valproate, 1,000 mg, oral, BID        Continuous medications       PRN medications  PRN medications: [MAR Hold] acetaminophen **OR** [MAR Hold] acetaminophen **OR** [MAR Hold] acetaminophen, [MAR Hold] benzocaine-menthol, [MAR Hold] dextromethorphan-guaifenesin, [MAR Hold] guaiFENesin, [MAR Hold] haloperidol lactate, [MAR Hold] polyethylene glycol     LABS  Admission on 02/23/2024   Component Date Value Ref Range Status    WBC 02/23/2024 5.0  4.4 - 11.3 x10*3/uL Final    nRBC 02/23/2024 0.0  0.0 - 0.0 /100 WBCs Final    RBC 02/23/2024 3.69 (L)  4.50 - 5.90 x10*6/uL Final    Hemoglobin 02/23/2024 11.2 (L)  13.5 - 17.5 g/dL Final    Hematocrit 02/23/2024 35.7 (L)  41.0 - 52.0 % Final    MCV 02/23/2024 97  80 - 100 fL Final    MCH 02/23/2024 30.4  26.0 - 34.0 pg Final    MCHC 02/23/2024 31.4 (L)  32.0 - 36.0 g/dL Final    RDW 02/23/2024 16.8 (H)  11.5 - 14.5 % Final    Platelets 02/23/2024 144 (L)  150 - 450 x10*3/uL Final    Neutrophils % 02/23/2024 49.7  40.0 - 80.0 % Final    Immature Granulocytes %, Automated 02/23/2024 0.4  0.0 - 0.9 % Final    Immature Granulocyte Count (IG) includes promyelocytes, myelocytes and metamyelocytes but does not include bands. Percent differential counts (%) should be interpreted in the context of the absolute cell counts (cells/UL).    Lymphocytes % 02/23/2024 30.9  13.0 - 44.0 % Final    Monocytes % 02/23/2024 15.0  2.0 - 10.0 % Final    Eosinophils % 02/23/2024 3.6  0.0 - 6.0 % Final    Basophils % 02/23/2024 0.4  0.0 - 2.0 % Final     Neutrophils Absolute 02/23/2024 2.48  1.20 - 7.70 x10*3/uL Final    Percent differential counts (%) should be interpreted in the context of the absolute cell counts (cells/uL).    Immature Granulocytes Absolute, Au* 02/23/2024 0.02  0.00 - 0.70 x10*3/uL Final    Lymphocytes Absolute 02/23/2024 1.54  1.20 - 4.80 x10*3/uL Final    Monocytes Absolute 02/23/2024 0.75  0.10 - 1.00 x10*3/uL Final    Eosinophils Absolute 02/23/2024 0.18  0.00 - 0.70 x10*3/uL Final    Basophils Absolute 02/23/2024 0.02  0.00 - 0.10 x10*3/uL Final    Glucose 02/23/2024 95  65 - 99 mg/dL Final    Sodium 02/23/2024 143  133 - 145 mmol/L Final    Potassium 02/23/2024 4.2  3.4 - 5.1 mmol/L Final    Chloride 02/23/2024 108 (H)  97 - 107 mmol/L Final    Bicarbonate 02/23/2024 29  24 - 31 mmol/L Final    Urea Nitrogen 02/23/2024 9  8 - 25 mg/dL Final    Creatinine 02/23/2024 0.60  0.40 - 1.60 mg/dL Final    eGFR 02/23/2024 >90  >60 mL/min/1.73m*2 Final    Calculations of estimated GFR are performed using the 2021 CKD-EPI Study Refit equation without the race variable for the IDMS-Traceable creatinine methods.  https://jasn.asnjournals.org/content/early/2021/09/22/ASN.5382126436    Calcium 02/23/2024 9.7  8.5 - 10.4 mg/dL Final    Albumin 02/23/2024 3.2 (L)  3.5 - 5.0 g/dL Final    Alkaline Phosphatase 02/23/2024 104  35 - 125 U/L Final    Total Protein 02/23/2024 6.4  5.9 - 7.9 g/dL Final    AST 02/23/2024 23  5 - 40 U/L Final    Bilirubin, Total 02/23/2024 0.2  0.1 - 1.2 mg/dL Final    ALT 02/23/2024 14  5 - 40 U/L Final    Anion Gap 02/23/2024 6  <=19 mmol/L Final    Magnesium 02/23/2024 2.00  1.60 - 3.10 mg/dL Final    POCT Lactate, Venous 02/23/2024 1.4  0.4 - 2.0 mmol/L Final    Ventricular Rate 02/23/2024 59  BPM Preliminary    Atrial Rate 02/23/2024 59  BPM Preliminary    NC Interval 02/23/2024 132  ms Preliminary    QRS Duration 02/23/2024 82  ms Preliminary    QT Interval 02/23/2024 404  ms Preliminary    QTC Calculation(Bazett) 02/23/2024  399  ms Preliminary    P Axis 02/23/2024 51  degrees Preliminary    R Axis 02/23/2024 60  degrees Preliminary    T Axis 02/23/2024 17  degrees Preliminary    QRS Count 02/23/2024 10  beats Preliminary    Q Onset 02/23/2024 221  ms Preliminary    P Onset 02/23/2024 155  ms Preliminary    P Offset 02/23/2024 207  ms Preliminary    T Offset 02/23/2024 423  ms Preliminary    QTC Fredericia 02/23/2024 401  ms Preliminary    Color, Urine 02/23/2024 Colorless (N)  Light-Yellow, Yellow, Dark-Yellow Final    Appearance, Urine 02/23/2024 Clear  Clear Final    Specific Gravity, Urine 02/23/2024 1.007  1.005 - 1.035 Final    pH, Urine 02/23/2024 7.5  5.0, 5.5, 6.0, 6.5, 7.0, 7.5, 8.0 Final    Protein, Urine 02/23/2024 NEGATIVE  NEGATIVE, 10 (TRACE), 20 (TRACE) mg/dL Final    Glucose, Urine 02/23/2024 Normal  Normal mg/dL Final    Blood, Urine 02/23/2024 NEGATIVE  NEGATIVE Final    Ketones, Urine 02/23/2024 NEGATIVE  NEGATIVE mg/dL Final    Bilirubin, Urine 02/23/2024 NEGATIVE  NEGATIVE Final    Urobilinogen, Urine 02/23/2024 Normal  Normal mg/dL Final    Nitrite, Urine 02/23/2024 NEGATIVE  NEGATIVE Final    Leukocyte Esterase, Urine 02/23/2024 NEGATIVE  NEGATIVE Final    POCT Glucose 02/23/2024 76  74 - 99 mg/dL Final    Amphetamine Screen, Urine 02/23/2024 Negative    Final    Barbiturate Screen, Urine 02/23/2024 Negative    Final    Benzodiazepines Screen, Urine 02/23/2024 Negative    Final    Cannabinoid Screen, Urine 02/23/2024 Negative    Final    Cocaine Metabolite Screen, Urine 02/23/2024 Negative    Final    Fentanyl Screen, Urine 02/23/2024 Negative     Final    Methadone Screen, Urine 02/23/2024 Negative    Final    Opiate Screen, Urine 02/23/2024 Negative    Final    Oxycodone Screen, Urine 02/23/2024 Negative    Final    PCP Screen, Urine 02/23/2024 Negative    Final    Ammonia 02/23/2024 25  12 - 45 umol/L Final    aPTT 02/23/2024 27.5  22.0 - 32.5 seconds Final    Protime 02/23/2024 13.2 (H)  9.3 - 12.7 seconds  Final    INR 02/23/2024 1.3 (H)  0.9 - 1.2 Final     IMAGING  ECG 12 lead    Result Date: 2/23/2024  Sinus bradycardia Possible Left atrial enlargement Borderline ECG When compared with ECG of 22-JUL-2023 19:50, Previous ECG has undetermined rhythm, needs review T wave inversion now evident in Anterior leads    CT cervical spine wo IV contrast    Result Date: 2/23/2024  Interpreted By:  Tristin Hernandez, STUDY: CT CERVICAL SPINE WO IV CONTRAST; 2/23/2024 8:57 am   INDICATION: Signs/Symptoms:fall hit head;   COMPARISON: None   ACCESSION NUMBER(S): [id number]   ORDERING CLINICIAN: ADRIANA HERNANDEZ   TECHNIQUE: Contiguous axial images were acquired from the skull base to the lung apices. Coronal and sagittal reformatted images were obtained. All CT examinations are performed with 1 or more of the following dose reduction techniques: Automated exposure control, adjustment of mA and/or kv according to patient's size, or use of iterative reconstruction techniques.   FINDINGS: There is straightening of the normal cervical lordosis.  No acute fracture or spondylolisthesis is identified. Facet degenerative changes and uncovertebral joint degenerative changes are present.     The occipital condyles, arch of C1, and the odontoid processes are intact. The atlantoaxial relationship is well maintained.   Mild-moderate disc space narrowing at C2-3 C3-4 and C4-5. Moderate-severe disc space narrowing at C5-6 and C6-7. No evidence for high-grade bony spinal canal stenosis. However there is multilevel high-grade bilateral neural foramina stenosis.         1. No acute fracture or spondylolisthesis. 2. Degenerative disc disease and spondylosis as described in the body of the report.     Signed by: Tristin Hernandez 2/23/2024 9:32 AM Dictation workstation:   [id number]    CT head wo IV contrast    Result Date: 2/23/2024  Interpreted By:  Tristin Hernandez, STUDY: ADRIANA HERNANDEZ; 2/23/2024 8:57 am   INDICATION: Signs/Symptoms:fall altered;    COMPARISON: None   ACCESSION NUMBER(S): [id number]   ORDERING CLINICIAN: ADRIANA HERNANDEZ   TECHNIQUE: Contiguous axial images were acquired from the vertex through the posterior fossa without IV contrast. All CT examinations are performed with 1 or more of the following dose reduction techniques: Automated exposure control, adjustment of mA and/or kv according to patient's size, or use of iterative reconstruction techniques.   FINDINGS: Small subcutaneous hematoma in the right periorbital location without underlying bony abnormality. No focal mass effect or midline shift is identified. The ventricles and sulci are symmetric and appropriate for the patient's age.   Mild-moderate degree of nonspecific white matter change most consistent with chronic small-vessel ischemic disease the gray white matter differentiation is preserved.   No acute intracranial hemorrhage is seen. No intra-axial or extra-axial fluid collection is seen.   The visualized paranasal sinuses and mastoid air cells are clear.       No CT evidence for acute intracranial pathology.   Signed by: Tristin Hernandez 2/23/2024 9:30 AM Dictation workstation:   [id number]    XR chest 1 view    Result Date: 2/23/2024  Interpreted By:  Tristin Hernandez, STUDY: XR CHEST 1 VIEW; 2/23/2024 9:08 am   INDICATION: Signs/Symptoms:fall.   COMPARISON: 02/18/2022   ACCESSION NUMBER(S): [id number]   ORDERING CLINICIAN: ADRIANA HERNANDEZ   TECHNIQUE: 1 view of the chest was performed.   FINDINGS: The lungs are adequately inflated. No acute consolidation. No pleural effusion. No pneumothorax.  The cardiomediastinal silhouette is within normal limits.       No acute cardiopulmonary disease.   Signed by: Tristin Hernandez 2/23/2024 9:25 AM Dictation workstation:   [id number]    XR abdomen 1 view    Result Date: 2/14/2024  * * *Final Report* * * DATE OF EXAM: Feb 14 2024  9:22AM   S5X   5289  -  XR ABDOMEN 1V SUPINE  / ACCESSION #  [id number] PROCEDURE REASON: abd pain      *  * * * Physician Interpretation * * * *  ABDOMEN X-RAY TECHNIQUE: Supine view, (2 images) COMPARISON: 01/30/2024 INDICATION:  Abdominal pain RESULT: No dilated bowel loops.  Gas and appropriate volume of formed stool are seen throughout normal caliber colon to the rectum.  Metallic clips, coils, and sutures project over the abdomen. -    IMPRESSION: Nonobstructive bowel gas pattern. : [facility]   Transcribe Date/Time: Feb 14 2024  9:32A Dictated by : MACIEL FLORES MD This examination was interpreted and the report reviewed and electronically signed by: MACIEL FLORES MD on Feb 14 2024  9:33AM  EST    XR abdomen 2 views supine and erect or decub    Result Date: 2/14/2024  * * *Final Report* * * DATE OF EXAM: Feb 14 2024  9:22AM   S5X   5289  -  XR ABDOMEN 1V SUPINE  / ACCESSION #  [id number] PROCEDURE REASON: abd pain      * * * * Physician Interpretation * * * *  ABDOMEN X-RAY TECHNIQUE: Supine view, (2 images) COMPARISON: 01/30/2024 INDICATION:  Abdominal pain RESULT: No dilated bowel loops.  Gas and appropriate volume of formed stool are seen throughout normal caliber colon to the rectum.  Metallic clips, coils, and sutures project over the abdomen. -    IMPRESSION: Nonobstructive bowel gas pattern. : [facility]   Transcribe Date/Time: Feb 14 2024  9:32A Dictated by : MACIEL FLORES MD This examination was interpreted and the report reviewed and electronically signed by: MACIEL FLORES MD on Feb 14 2024  9:33AM  EST    XR chest 1 view    Result Date: 1/30/2024  * * *Final Report* * * DATE OF EXAM: Jan 30 2024 12:16PM   CHESTER   [id number]  -  XR CHEST 1V FRONTAL PORT  / ACCESSION #  [id number] PROCEDURE REASON: Pulmonary Edema      * * * * Physician Interpretation * * * *  EXAMINATION:  CHEST RADIOGRAPH (PORTABLE SINGLE VIEW AP) Exam Date/Time:  1/30/2024 12:16 PM Clinical History: Pulmonary Edema MQ:  XCPMC_6 Comparison:  2 days prior RESULT: Lines, tubes, and devices:  Interval removal of nasogastric tube.   LEFT PICC terminates at the superior cavoatrial junction. Lungs and pleura:  Lung volumes remain low.  Mild interstitial opacities are in both lungs, LEFT worse than RIGHT with bibasilar atelectasis and trace LEFT pleural effusion.  No substantial pneumothorax is identified. Cardiomediastinal silhouette:  The cardiomediastinal silhouette is stable since the prior exam. Other:    IMPRESSION: See result : [facility]   Transcribe Date/Time: Jan 30 2024 12:29P Dictated by : JUSTIN SNYDER MD This examination was interpreted and the report reviewed and electronically signed by: JUSTIN SNYDER MD on Jan 30 2024 12:30PM  EST    XR abdomen 2 views supine and erect or decub    Result Date: 1/30/2024  * * *Final Report* * * DATE OF EXAM: Jan 30 2024  5:37AM   CHE[facility]   5289  -  XR ABDOMEN 1V SUPINE  / ACCESSION #  [id number] PROCEDURE REASON: Ileus vs obstruction      * * * * Physician Interpretation * * * *  PORTABLE SUPINE ABDOMEN RADIOGRAPH  1/30/2024 5:37 AM HISTORY: Ileus TECHNIQUE:  2 supine abdominal image(s).    IMPRESSION: Scattered gas in nondilated colon and small bowel.   Herniorrhaphy mesh overlies the left abdomen.  Rectal management device in lower midline pelvis.  Nasogastric tube has been removed. : [facility]   Transcribe Date/Time: Jan 30 2024  7:49A Dictated by : AIDEE OSBORNE MD This examination was interpreted and the report reviewed and electronically signed by: AIDEE OSBORNE MD on Jan 30 2024  7:57AM  EST    XR abdomen 2 views supine and erect or decub    Result Date: 1/29/2024  * * *Final Report* * * DATE OF EXAM: Jan 29 2024  6:38AM   CHE[facility]   5289  -  XR ABDOMEN 1V SUPINE  / ACCESSION #  [id number] PROCEDURE REASON: Bowel obstruction suspected      * * * * Physician Interpretation * * * *  ABDOMEN PORTABLE 01/29/2024 6:22 AM HISTORY: Bowel obstruction suspected. TECHNIQUE: Single View, Supine Abdomen; Number of Images: 1 with additional postprocessing COMPARISON: KUB 01/28/2024 RESULT: See  impression.    IMPRESSION: NG/OG tube terminating in the gastric body, side port below the level of the diaphragm.  Gas in nondilated loops of both small and large bowel.   Herniorrhaphy mesh tacks over the LEFT abdomen.  Midline and right lateral abdominal abdominal surgical clips. : [facility]   Transcribe Date/Time: Jan 29 2024  7:30A Dictated by : CRISTIANE DURAN This examination was interpreted and the report reviewed and electronically signed by: KATHY JACK MD on Jan 29 2024  7:34AM  EST    XR chest 1 view    Result Date: 1/28/2024  * * *Final Report* * * DATE OF EXAM: Jan 28 2024  5:00PM   CHE[facility]   5376  -  XR CHEST 1V FRONTAL PORT  / ACCESSION #  [id number] PROCEDURE REASON: Cough      * * * * Physician Interpretation * * * *  EXAMINATION:  CHEST RADIOGRAPH (PORTABLE SINGLE VIEW AP) Exam Date/Time:  1/28/2024 5:00 PM Clinical History: Cough, Shortness of breath MQ:  XCPMC_6 Comparison:  1 day prior RESULT: Lines, tubes, and devices:  The tip of a left PICC terminates likely in the right atrium.  An NG/OG tube extends below the diaphragm.  The tip of an endotracheal tube is obscured although appears to be above the petr. Lungs and pleura:  Stable right hemidiaphragm elevation.  There is likely mild atelectasis at the lung bases.  There may be small pleural effusions.  The left lung apex is partially obscured by an overlying device.  No pulmonary edema or substantial pneumothorax. Cardiomediastinal silhouette:  Stable cardiomediastinal silhouette. Other:  .    IMPRESSION: See result. : [facility]   Transcribe Date/Time: Jan 28 2024  6:00P Dictated by : VERONICA MORIN MD This examination was interpreted and the report reviewed and electronically signed by: VERONICA MORIN MD on Jan 28 2024  6:02PM  EST    XR abdomen 2 views supine and erect or decub    Result Date: 1/28/2024  * * *Final Report* * * DATE OF EXAM: Jan 28 2024 10:38AM   [facility]   5289  -  XR ABDOMEN 1V SUPINE  / ACCESSION #   [id number] PROCEDURE REASON: Abdominal distension      * * * * Physician Interpretation * * * *  PLAIN FILM OF THE ABDOMEN CLINICAL INFORMATION: Abdominal distension. DATE: 1/28/2024 at 10:29 AM TECHNIQUE: Supine  abdomen, 1 view(s); 2 images RESULT: see impression.    IMPRESSION: NG/OG tube with tip and side-port in the gastric body/antrum. No gas-filled dilated bowel loops. : [facility]   Transcribe Date/Time: Jan 28 2024  2:31P Dictated by : BERNICE ESQUIVEL MD This examination was interpreted and the report reviewed and electronically signed by: BERNICE ESQUIVEL MD on Jan 28 2024  2:33PM  EST    XR chest 1 view    Result Date: 1/27/2024  * * *Final Report* * * DATE OF EXAM: Jan 27 2024  3:50PM   [facility]   5376  -  XR CHEST 1V FRONTAL PORT  / ACCESSION #  [id number] PROCEDURE REASON: Hypoxemia      * * * * Physician Interpretation * * * *  EXAMINATION:  CHEST RADIOGRAPH (PORTABLE SINGLE VIEW AP) Exam Date/Time:  1/27/2024 3:50 PM Clinical History: Hypoxemia MQ:  XCPMC_6 Comparison:  2 days prior RESULT: Lines, tubes, and devices:  The malpositioned right PICC present on the prior exam has been removed.  Interval placement of a left PICC with the tip near the superior cavoatrial junction.  The tip of an endotracheal tube is above the petr.  An NG/OG tube extends below the diaphragm. Lungs and pleura:  Improved inspiration.  Hazy opacities at the lung bases are probably due to small pleural effusions with adjacent atelectasis although superimposed pneumonia/aspiration is possible.   Bilateral reticular opacities, left greater than right, are suggestive of vascular congestion/mild pulmonary edema.  No substantial pneumothorax. Cardiomediastinal silhouette:  Stable borderline enlarged cardiomediastinal silhouette. Other:  .    IMPRESSION: See result. : [facility]   Transcribe Date/Time: Jan 27 2024  3:53P Dictated by : VERONICA MORIN MD This examination was interpreted and the report reviewed and  electronically signed by: VERONICA MORIN MD on Jan 27 2024  3:56PM  EST    XR abdomen 2 views supine and erect or decub    Result Date: 1/27/2024  * * *Final Report* * * DATE OF EXAM: Jan 27 2024  7:03AM   [facility]   [id number]  -  XR ABDOMEN 1V SUPINE  / ACCESSION #  [id number] PROCEDURE REASON: Abdominal distension      * * * * Physician Interpretation * * * *  EXAM: KUB ABDOMEN. CLINICAL INFORMATION: Abdominal distention TECHNIQUE: Supine abdomen, 2 image(s) COMPARISON: 01/25/2024 RESULT:  See Impression.    IMPRESSION: Support lines and tubes:  Nasogastric tube, with its tip in the midgastric body, its sidehole is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

## 2024-02-23 NOTE — ED PROVIDER NOTES
HPI   No chief complaint on file.      Patient is a 53-year-old male with history of schizophrenia, alcohol use disorder brought in from [facility] for fall mental status.  Patient is unable to provide an adequate history at this time.  Per EMS, patient's last known well was Wednesday when he arrived to the psychiatric facility.  He was transferred to the psychiatric facility from the [facility] for further management of schizophrenia and hyperactive delirium.  It is unclear whether the fall was witnessed or unwitnessed at this time.  Unknown whether syncopal or mechanical.                        Mike Coma Scale Score: 11         NIH Stroke Scale: 2             Patient History   Past Medical History:   Diagnosis Date    Hernia, abdominal     Hypertension      No past surgical history on file.  No family history on file.  Social History     Tobacco Use    Smoking status: Every Day     Types: Cigarettes    Smokeless tobacco: Not on file   Substance Use Topics    Alcohol use: Not Currently    Drug use: Not on file       Physical Exam   ED Triage Vitals   Temp Pulse Resp BP   -- -- -- --      SpO2 Temp src Heart Rate Source Patient Position   -- -- -- --      BP Location FiO2 (%)     -- --       Physical Exam  Vitals and nursing note reviewed.   Constitutional:       General: He is not in acute distress.     Comments: Patient is awake and alert, but not following examiner commands.  Speaking in sentences that do not make sense.   HENT:      Head:      Comments: 2 cm linear laceration to the right eyebrow.  No other acute abnormalities.     Mouth/Throat:      Mouth: Mucous membranes are moist.      Pharynx: Oropharynx is clear.   Eyes:      Extraocular Movements: Extraocular movements intact.      Pupils: Pupils are equal, round, and reactive to light.   Cardiovascular:      Rate and Rhythm: Normal rate and regular rhythm.      Heart sounds: Normal heart sounds.   Pulmonary:      Effort: Pulmonary  effort is normal. No respiratory distress.      Breath sounds: Normal breath sounds.   Neurological:      Comments: GCS 14. Able to state his name but unable to answer further questions. No unilateral weakness.          ED Course & MDM   ED Course as of 02/23/24 1254   Fri Feb 23, 2024   0930 Spoke with radiology operations regarding CT scan for urgent read given it is a limited trauma with head injury.  CT states that radiologist is reading CT right now. [JJ]   0933 CT head negative for any acute intracranial pathology at this time. [JJ]   1118 I spoke with hospitalist on-call Dr. Hernandez.  We discussed patient case.  Patient will be admitted under his service to stepdown unit for further evaluation of his altered mental status. [JJ]      ED Course User Index  [JJ] Nalini Arvizu PA-C         Diagnoses as of 02/23/24 1254   Altered mental status, unspecified altered mental status type   Laceration of right temporomandibular area without foreign body, initial encounter   Fall, initial encounter       Medical Decision Making  Parts of this chart have been completed using voice recognition software. Please excuse any errors of transcription.  My thought process and reason for plan has been formulated from the time that I saw the patient until the time of disposition and is not specific to one specific moment during their visit and furthermore my MDM encompasses this entire chart and not only this text box.      HPI: Detailed above.    Exam: A medically appropriate exam performed, outlined above, given the known history and presentation.    History obtained from: EMS    EKG: Nonischemic no STEMI    Social Determinants of Health considered during this visit: Presenting from [facility]    Medications given during visit:  Medications   diphth,pertus(acell),tetanus (BoostRIX) 2.5-8-5 Lf-mcg-Lf/0.5mL vaccine 0.5 mL (0.5 mL intramuscular Given 2/23/24 1002)   lidocaine-epinephrine (Xylocaine W/EPI) 1  %-1:100,000 injection 10 mL (10 mL infiltration Given 2/23/24 1002)   haloperidol lactate (Haldol) injection 5 mg (5 mg intramuscular Given 2/23/24 1208)   diphenhydrAMINE (BENADryl) injection 25 mg (25 mg intramuscular Given 2/23/24 1208)        Diagnostic/tests  Labs Reviewed   CBC WITH AUTO DIFFERENTIAL - Abnormal       Result Value    WBC 5.0      nRBC 0.0      RBC 3.69 (*)     Hemoglobin 11.2 (*)     Hematocrit 35.7 (*)     MCV 97      MCH 30.4      MCHC 31.4 (*)     RDW 16.8 (*)     Platelets 144 (*)     Neutrophils % 49.7      Immature Granulocytes %, Automated 0.4      Lymphocytes % 30.9      Monocytes % 15.0      Eosinophils % 3.6      Basophils % 0.4      Neutrophils Absolute 2.48      Immature Granulocytes Absolute, Automated 0.02      Lymphocytes Absolute 1.54      Monocytes Absolute 0.75      Eosinophils Absolute 0.18      Basophils Absolute 0.02     COMPREHENSIVE METABOLIC PANEL - Abnormal    Glucose 95      Sodium 143      Potassium 4.2      Chloride 108 (*)     Bicarbonate 29      Urea Nitrogen 9      Creatinine 0.60      eGFR >90      Calcium 9.7      Albumin 3.2 (*)     Alkaline Phosphatase 104      Total Protein 6.4      AST 23      Bilirubin, Total 0.2      ALT 14      Anion Gap 6     URINALYSIS WITH REFLEX CULTURE AND MICROSCOPIC - Abnormal    Color, Urine Colorless (*)     Appearance, Urine Clear      Specific Gravity, Urine 1.007      pH, Urine 7.5      Protein, Urine NEGATIVE      Glucose, Urine Normal      Blood, Urine NEGATIVE      Ketones, Urine NEGATIVE      Bilirubin, Urine NEGATIVE      Urobilinogen, Urine Normal      Nitrite, Urine NEGATIVE      Leukocyte Esterase, Urine NEGATIVE     PROTIME-INR - Abnormal    Protime 13.2 (*)     INR 1.3 (*)     Narrative:     INR Therapeutic Range: 2.0-3.5   MAGNESIUM - Normal    Magnesium 2.00     BLOOD GAS LACTIC ACID, VENOUS - Normal    POCT Lactate, Venous 1.4     DRUG SCREEN,URINE - Normal    Amphetamine Screen, Urine Negative      Barbiturate  Screen, Urine Negative      Benzodiazepines Screen, Urine Negative      Cannabinoid Screen, Urine Negative      Cocaine Metabolite Screen, Urine Negative      Fentanyl Screen, Urine Negative      Methadone Screen, Urine Negative      Opiate Screen, Urine Negative      Oxycodone Screen, Urine Negative      PCP Screen, Urine Negative      Narrative:     These toxicological screening tests provide unconfirmed qualitative measurements to aid in treatment and diagnosis in cases of drug use or overdose. This test is used only for medical purposes. A positive result does not indicate or measure intoxication. For specific test performance or pathologist consultation, please contact the Laboratory.    The following threshold concentrations are used for these analyses.Values at or above the threshold concentration are reported as positive. Values below the threshold are reported as negative.    Drug /Screening Threshold                                                                                                 THC/CANNABINOIDS................50 ng/ml  METHADONE......................300 ng/ml  COCAINE METABOLITES............300 ng/ml  BENZODIAZEPINE.................300 ng/ml  PCP.............................25 ng/ml  OPIATE.........................300 ng/ml  AMPHETAMINE/ECSTASY...........1000 ng/ml  BARBITURATE....................200 ng/ml  OXYCODONE......................100 ng/ml  FENTANYL.........................5 ng/ml       AMMONIA - Normal    Ammonia 25     APTT - Normal    aPTT 27.5     POCT GLUCOSE - Normal    POCT Glucose 76     URINALYSIS WITH REFLEX CULTURE AND MICROSCOPIC    Narrative:     The following orders were created for panel order Urinalysis with Reflex Culture and Microscopic.  Procedure                               Abnormality         Status                     ---------                               -----------         ------                     Urinalysis with Reflex C...[[id number]]  Abnormal             Final result               Extra Urine Gray Tube[[id number]]                                                         Please view results for these tests on the individual orders.   EXTRA URINE GRAY TUBE      XR chest 1 view   Final Result   No acute cardiopulmonary disease.        Signed by: Tristin Hernandez 2/23/2024 9:25 AM   Dictation workstation:   [id number]      CT head wo IV contrast   Final Result   No CT evidence for acute intracranial pathology.        Signed by: Tristin Hernandez 2/23/2024 9:30 AM   Dictation workstation:   [id number]      CT cervical spine wo IV contrast   Final Result   1. No acute fracture or spondylolisthesis.   2. Degenerative disc disease and spondylosis as described in the body   of the report.             Signed by: Tristin Hernandez 2/23/2024 9:32 AM   Dictation workstation:   [id number]           Considerations/further MDM:  53-year-old male history of schizophrenia, hyperactive delirium, alcohol use disorder presenting for evaluation of fall with altered mental status.  GCS 14 due to confusion upon arrival.  Vital signs within normal limits.  NIH stroke scale score of 2 due to dysarthria with no apparent focal deficit.  Limited trauma was activated. Unaware of patient's baseline upon arrival.  I considered ACS, intracranial hemorrhage, subarachnoid hemorrhage, acute CVA, TIA, delirium, UTI, hyperammonemia, electrolyte abnormality, sepsis, drug or alcohol abuse.  CT head and neck was performed and negative for any acute pathology.  I have low suspicion for acute stroke at this time given the patient was just given heparin and has no acute focal deficits only his delirium which has been present in his past upon chart review.  Thus TNK was not administered.  Basic laboratory workup unremarkable.  Lactate within normal limits.  No evidence of hypoglycemia.  Attempted to contact family for further information on patient's baseline mental status with no answer.  Patient  remained intermittently confused during the ER visit.  Attending physician spoke with general surgery regarding Limited trauma status. Given the patient's unknown baseline mental status, patient was admitted to hospital medicine for further evaluation of his altered mental status.  I spoke with hospitalist on-call Dr. Hernandez we discussed patient case and he was agreeable to this and admitted the patient to stepdown unit for further care.  Patient remained stable while in the ER.      Procedure  Laceration Repair    Performed by: Nalini Arvizu PA-C  Authorized by: Paul Francois DO    Consent:     Consent obtained:  Verbal    Consent given by:  Patient    Risks, benefits, and alternatives were discussed: yes      Risks discussed:  Pain  Universal protocol:     Patient identity confirmed:  Arm band  Anesthesia:     Anesthesia method:  Local infiltration    Local anesthetic:  Lidocaine 1% WITH epi  Laceration details:     Location:  Face    Face location:  R eyebrow    Length (cm):  2  Pre-procedure details:     Preparation:  Patient was prepped and draped in usual sterile fashion  Exploration:     Hemostasis achieved with:  Direct pressure    Wound exploration: entire depth of wound visualized      Wound extent: no foreign bodies/material noted      Contaminated: no    Treatment:     Area cleansed with:  Chlorhexidine and saline    Amount of cleaning:  Standard    Irrigation solution:  Sterile water    Irrigation method:  Syringe    Debridement:  None    Undermining:  None    Scar revision: no    Skin repair:     Repair method:  Sutures    Suture size:  4-0    Suture material:  Prolene    Suture technique:  Simple interrupted    Number of sutures:  2  Approximation:     Approximation:  Close  Repair type:     Repair type:  Simple  Post-procedure details:     Dressing:  Open (no dressing)    Procedure completion:  Tolerated with difficulty       Nalini Arvizu PA-C  02/23/24 2141

## 2024-02-24 ENCOUNTER — APPOINTMENT (OUTPATIENT)
Dept: CARDIOLOGY | Facility: HOSPITAL | Age: 54
DRG: 312 | End: 2024-02-24
Payer: MEDICARE

## 2024-02-24 LAB
ALBUMIN SERPL-MCNC: 3.1 G/DL (ref 3.5–5)
ALP BLD-CCNC: 104 U/L (ref 35–125)
ALT SERPL-CCNC: 21 U/L (ref 5–40)
ANION GAP SERPL CALC-SCNC: 11 MMOL/L
AST SERPL-CCNC: 35 U/L (ref 5–40)
BILIRUB SERPL-MCNC: 0.3 MG/DL (ref 0.1–1.2)
BUN SERPL-MCNC: 10 MG/DL (ref 8–25)
CALCIUM SERPL-MCNC: 9.7 MG/DL (ref 8.5–10.4)
CHLORIDE SERPL-SCNC: 103 MMOL/L (ref 97–107)
CO2 SERPL-SCNC: 22 MMOL/L (ref 24–31)
CREAT SERPL-MCNC: 0.8 MG/DL (ref 0.4–1.6)
EGFRCR SERPLBLD CKD-EPI 2021: >90 ML/MIN/1.73M*2
ERYTHROCYTE [DISTWIDTH] IN BLOOD BY AUTOMATED COUNT: 17.2 % (ref 11.5–14.5)
FLUAV RNA RESP QL NAA+PROBE: DETECTED
FLUBV RNA RESP QL NAA+PROBE: NOT DETECTED
GLUCOSE BLD MANUAL STRIP-MCNC: 94 MG/DL (ref 74–99)
GLUCOSE SERPL-MCNC: 115 MG/DL (ref 65–99)
HCT VFR BLD AUTO: 35.4 % (ref 41–52)
HGB BLD-MCNC: 11.3 G/DL (ref 13.5–17.5)
MCH RBC QN AUTO: 30 PG (ref 26–34)
MCHC RBC AUTO-ENTMCNC: 31.9 G/DL (ref 32–36)
MCV RBC AUTO: 94 FL (ref 80–100)
NRBC BLD-RTO: 0 /100 WBCS (ref 0–0)
PLATELET # BLD AUTO: 147 X10*3/UL (ref 150–450)
POTASSIUM SERPL-SCNC: 4.4 MMOL/L (ref 3.4–5.1)
PROT SERPL-MCNC: 6.4 G/DL (ref 5.9–7.9)
RBC # BLD AUTO: 3.77 X10*6/UL (ref 4.5–5.9)
SARS-COV-2 RNA RESP QL NAA+PROBE: NOT DETECTED
SODIUM SERPL-SCNC: 136 MMOL/L (ref 133–145)
WBC # BLD AUTO: 4.6 X10*3/UL (ref 4.4–11.3)

## 2024-02-24 PROCEDURE — 82947 ASSAY GLUCOSE BLOOD QUANT: CPT

## 2024-02-24 PROCEDURE — 93306 TTE W/DOPPLER COMPLETE: CPT

## 2024-02-24 PROCEDURE — 84075 ASSAY ALKALINE PHOSPHATASE: CPT | Performed by: INTERNAL MEDICINE

## 2024-02-24 PROCEDURE — 93306 TTE W/DOPPLER COMPLETE: CPT | Performed by: INTERNAL MEDICINE

## 2024-02-24 PROCEDURE — 2500000002 HC RX 250 W HCPCS SELF ADMINISTERED DRUGS (ALT 637 FOR MEDICARE OP, ALT 636 FOR OP/ED): Mod: MUE

## 2024-02-24 PROCEDURE — 2500000004 HC RX 250 GENERAL PHARMACY W/ HCPCS (ALT 636 FOR OP/ED)

## 2024-02-24 PROCEDURE — 2500000005 HC RX 250 GENERAL PHARMACY W/O HCPCS

## 2024-02-24 PROCEDURE — 85027 COMPLETE CBC AUTOMATED: CPT | Performed by: INTERNAL MEDICINE

## 2024-02-24 PROCEDURE — 2500000001 HC RX 250 WO HCPCS SELF ADMINISTERED DRUGS (ALT 637 FOR MEDICARE OP)

## 2024-02-24 PROCEDURE — 87636 SARSCOV2 & INF A&B AMP PRB: CPT

## 2024-02-24 PROCEDURE — 36415 COLL VENOUS BLD VENIPUNCTURE: CPT | Performed by: INTERNAL MEDICINE

## 2024-02-24 PROCEDURE — 99291 CRITICAL CARE FIRST HOUR: CPT

## 2024-02-24 PROCEDURE — 99222 1ST HOSP IP/OBS MODERATE 55: CPT | Performed by: STUDENT IN AN ORGANIZED HEALTH CARE EDUCATION/TRAINING PROGRAM

## 2024-02-24 PROCEDURE — 2020000001 HC ICU ROOM DAILY

## 2024-02-24 RX ORDER — METOPROLOL TARTRATE 50 MG/1
50 TABLET ORAL 2 TIMES DAILY
Status: DISCONTINUED | OUTPATIENT
Start: 2024-02-24 | End: 2024-02-28 | Stop reason: HOSPADM

## 2024-02-24 RX ORDER — TALC
6 POWDER (GRAM) TOPICAL NIGHTLY
Status: DISCONTINUED | OUTPATIENT
Start: 2024-02-24 | End: 2024-02-28 | Stop reason: HOSPADM

## 2024-02-24 RX ORDER — AMLODIPINE BESYLATE 10 MG/1
10 TABLET ORAL DAILY
Status: DISCONTINUED | OUTPATIENT
Start: 2024-02-24 | End: 2024-02-28 | Stop reason: HOSPADM

## 2024-02-24 RX ADMIN — KETOROLAC TROMETHAMINE 30 MG: 30 INJECTION, SOLUTION INTRAMUSCULAR; INTRAVENOUS at 12:31

## 2024-02-24 RX ADMIN — METOPROLOL TARTRATE 50 MG: 50 TABLET ORAL at 22:00

## 2024-02-24 RX ADMIN — RISPERIDONE 1 MG: 1 TABLET, FILM COATED ORAL at 22:00

## 2024-02-24 RX ADMIN — DEXTROSE MONOHYDRATE 500 MG: 50 INJECTION, SOLUTION INTRAVENOUS at 14:26

## 2024-02-24 RX ADMIN — DEXTROSE MONOHYDRATE 500 MG: 50 INJECTION, SOLUTION INTRAVENOUS at 22:00

## 2024-02-24 RX ADMIN — HALOPERIDOL LACTATE 10 MG: 5 INJECTION, SOLUTION INTRAMUSCULAR at 22:00

## 2024-02-24 RX ADMIN — DEXTROSE MONOHYDRATE 500 MG: 50 INJECTION, SOLUTION INTRAVENOUS at 04:00

## 2024-02-24 RX ADMIN — DEXTROSE MONOHYDRATE 500 MG: 50 INJECTION, SOLUTION INTRAVENOUS at 09:42

## 2024-02-24 RX ADMIN — ACETAMINOPHEN 975 MG: 325 TABLET ORAL at 07:27

## 2024-02-24 RX ADMIN — Medication 6 MG: at 22:00

## 2024-02-24 RX ADMIN — KETOROLAC TROMETHAMINE 30 MG: 30 INJECTION, SOLUTION INTRAMUSCULAR; INTRAVENOUS at 18:19

## 2024-02-24 RX ADMIN — HALOPERIDOL LACTATE 10 MG: 5 INJECTION, SOLUTION INTRAMUSCULAR at 08:51

## 2024-02-24 RX ADMIN — ACETAMINOPHEN 975 MG: 325 TABLET ORAL at 18:37

## 2024-02-24 SDOH — SOCIAL STABILITY: SOCIAL INSECURITY: ABUSE: ADULT

## 2024-02-24 SDOH — SOCIAL STABILITY: SOCIAL INSECURITY: DO YOU FEEL ANYONE HAS EXPLOITED OR TAKEN ADVANTAGE OF YOU FINANCIALLY OR OF YOUR PERSONAL PROPERTY?: UNABLE TO ASSESS

## 2024-02-24 SDOH — SOCIAL STABILITY: SOCIAL INSECURITY: ARE YOU OR HAVE YOU BEEN THREATENED OR ABUSED PHYSICALLY, EMOTIONALLY, OR SEXUALLY BY ANYONE?: UNABLE TO ASSESS

## 2024-02-24 SDOH — SOCIAL STABILITY: SOCIAL INSECURITY: HAVE YOU HAD THOUGHTS OF HARMING ANYONE ELSE?: UNABLE TO ASSESS

## 2024-02-24 SDOH — HEALTH STABILITY: MENTAL HEALTH: EXPERIENCED ANY OF THE FOLLOWING LIFE EVENTS: DEATH OF FAMILY/FRIEND

## 2024-02-24 SDOH — SOCIAL STABILITY: SOCIAL INSECURITY: ARE THERE ANY APPARENT SIGNS OF INJURIES/BEHAVIORS THAT COULD BE RELATED TO ABUSE/NEGLECT?: UNABLE TO ASSESS

## 2024-02-24 SDOH — SOCIAL STABILITY: SOCIAL INSECURITY: HAS ANYONE EVER THREATENED TO HURT YOUR FAMILY OR YOUR PETS?: UNABLE TO ASSESS

## 2024-02-24 SDOH — SOCIAL STABILITY: SOCIAL INSECURITY: DOES ANYONE TRY TO KEEP YOU FROM HAVING/CONTACTING OTHER FRIENDS OR DOING THINGS OUTSIDE YOUR HOME?: UNABLE TO ASSESS

## 2024-02-24 ASSESSMENT — PAIN - FUNCTIONAL ASSESSMENT
PAIN_FUNCTIONAL_ASSESSMENT: 0-10
PAIN_FUNCTIONAL_ASSESSMENT: FLACC (FACE, LEGS, ACTIVITY, CRY, CONSOLABILITY)
PAIN_FUNCTIONAL_ASSESSMENT: 0-10

## 2024-02-24 ASSESSMENT — PAIN SCALES - GENERAL
PAINLEVEL_OUTOF10: 0 - NO PAIN
PAINLEVEL_OUTOF10: 7
PAINLEVEL_OUTOF10: 0 - NO PAIN
PAINLEVEL_OUTOF10: 0 - NO PAIN
PAINLEVEL_OUTOF10: 6
PAINLEVEL_OUTOF10: 0 - NO PAIN

## 2024-02-24 ASSESSMENT — PATIENT HEALTH QUESTIONNAIRE - PHQ9
2. FEELING DOWN, DEPRESSED OR HOPELESS: NOT AT ALL
SUM OF ALL RESPONSES TO PHQ9 QUESTIONS 1 & 2: 0
1. LITTLE INTEREST OR PLEASURE IN DOING THINGS: NOT AT ALL

## 2024-02-24 ASSESSMENT — LIFESTYLE VARIABLES
PRESCIPTION_ABUSE_PAST_12_MONTHS: NO
AUDIT-C TOTAL SCORE: -1
SKIP TO QUESTIONS 9-10: 0
HOW OFTEN DO YOU HAVE 6 OR MORE DRINKS ON ONE OCCASION: PATIENT DECLINED
HOW OFTEN DO YOU HAVE A DRINK CONTAINING ALCOHOL: PATIENT DECLINED
AUDIT-C TOTAL SCORE: -1
SUBSTANCE_ABUSE_PAST_12_MONTHS: NO
HOW MANY STANDARD DRINKS CONTAINING ALCOHOL DO YOU HAVE ON A TYPICAL DAY: PATIENT DECLINED

## 2024-02-24 ASSESSMENT — PAIN DESCRIPTION - LOCATION
LOCATION: OTHER (COMMENT)
LOCATION: HEAD

## 2024-02-24 NOTE — H&P
[facility]       Date:  2/23/2024  Patient:  Delta Phoenix  YOB: 1970  MRN:  [id number]   Admit Date:  2/23/2024  ========================================================================================================    No chief complaint on file.        History of Present Illness:  Delta Phoenix is a 53 y.o. year old male patient with Past Medical History of  schizophrenia, ETOH abuse, multiple abdominal/hernia repair surgeries with most recent 1/17 at [facility].  He was sent to [facility] and discharged to [facility] 2/20.  He presented to ED today via EMS after a fall this morning.  This was not witnessed. Unknown if it was a possible syncopal episode vs mechanical fall.  He presented with a laceration to right eyebrow which was sutured in ED. Tetanus vaccine given. Vitals stable and labs unremarkable in ED. CT head and CT cervical spine showed nothing acute.  Patient became combative and violent with ED staff requiring 4 point restraints to be applied.  IM haldol and IM benadryl given. Patient was admitted to ICU for further management.      Medical History:  Past Medical History:   Diagnosis Date    Hernia, abdominal     Hypertension     Left fibular fracture     Schizophrenia (CMS/HCC)      Past Surgical History:   Procedure Laterality Date    COLON SURGERY      HERNIA REPAIR       Medications Prior to Admission   Medication Sig Dispense Refill Last Dose    amLODIPine (Norvasc) 5 mg tablet Take 2 tablets (10 mg) by mouth once daily.       benztropine (Cogentin) 1 mg tablet Take 1 tablet (1 mg) by mouth 2 times a day.       clotrimazole (Lotrimin) 1 % cream Apply 1 Application topically 2 times a day.       haloperidol (Haldol) 10 mg tablet Take 1 tablet (10 mg) by mouth 2 times a day.       melatonin 3 mg tablet Take 2 tablets (6 mg) by mouth once daily at bedtime.       metoprolol tartrate (Lopressor) 50 mg tablet Take 1 tablet by mouth 2 times a day.        nicotine (Nicoderm CQ) 21 mg/24 hr patch Place 1 patch on the skin once every 24 hours.       valproate (Depakene) 250 mg/5 mL oral solution Take 20 mL (1,000 mg) by mouth 2 times a day.        Lorazepam, Ziprasidone hcl, Aspirin, and Poison ivy extract  Social History     Tobacco Use    Smoking status: Every Day     Types: Cigarettes   Substance Use Topics    Alcohol use: Not Currently     No family history on file.    Hospital Medications:           Current Facility-Administered Medications:     acetaminophen (Tylenol) tablet 975 mg, 975 mg, oral, q6h PRN, CARMEN Delaney    [MAR Hold] amLODIPine (Norvasc) tablet 10 mg, 10 mg, oral, Daily, Kelvin Hernandez MD    [MAR Hold] benzocaine-menthol (Cepastat Sore Throat) 15-3.6 mg lozenge 1 lozenge, 1 lozenge, Mouth/Throat, q2h PRN, Kelvin Hernandez MD    [MAR Hold] benztropine (Cogentin) tablet 1 mg, 1 mg, oral, BID, Kelvin Hernandez MD    [MAR Hold] clotrimazole (Lotrimin) 1 % cream 1 Application, 1 Application, Topical, BID, Kelvin Hernandez MD    [MAR Hold] dextromethorphan-guaifenesin (Robitussin DM)  mg/5 mL oral liquid 5 mL, 5 mL, oral, q4h PRN, Kelvin Hernandez MD    diphenhydrAMINE (BENADryl) injection 25 mg, 25 mg, intravenous, q6h PRN, CARMEN Delaney    [MAR Hold] guaiFENesin (Mucinex) 12 hr tablet 600 mg, 600 mg, oral, q12h PRN, Kelvin Hernandez MD    [MAR Hold] haloperidol (Haldol) tablet 10 mg, 10 mg, oral, BID, Kelvin Hernandez MD    haloperidol lactate (Haldol) injection 10 mg, 10 mg, intramuscular, BID, CARMEN Delaney    ketorolac (Toradol) injection 30 mg, 30 mg, intravenous, q6h PRN, CARMEN Delaney, 30 mg at 02/23/24 1822    labetaloL (Normodyne,Trandate) injection 10 mg, 10 mg, intravenous, q4h PRN, CARMEN Delanye    [MAR Hold] melatonin tablet 6 mg, 6 mg, oral, Nightly, Kelvin Hernandez MD    [MAR Hold] metoprolol tartrate (Lopressor) tablet 50 mg, 50 mg, oral, BID,  "Kelvin Hernandez MD    [MAR Hold] nicotine (Nicoderm CQ) 21 mg/24 hr patch 1 patch, 1 patch, transdermal, q24h, Kelvin Hernandez MD, 1 patch at 02/23/24 1401    perflutren lipid microspheres (Definity) injection 0.5-10 mL of dilution, 0.5-10 mL of dilution, intravenous, Once in imaging, CARMEN Delaney    perflutren protein A microsphere (Optison) injection 0.5 mL, 0.5 mL, intravenous, Once in imaging, CARMEN Delaney    [MAR Hold] polyethylene glycol (Glycolax, Miralax) packet 17 g, 17 g, oral, Daily PRN, Kelvin Hernandez MD    risperiDONE (RisperDAL) tablet 1 mg, 1 mg, oral, BID, CARMEN Delaney    sulfur hexafluoride microsphr (Lumason) injection 24.28 mg, 2 mL, intravenous, Once in imaging, CARMEN Delaney    valproate (Depacon) 500 mg in dextrose 5 % in water (D5W) 50 mL IV, 500 mg, intravenous, q6h, CARMEN Delaney    [MAR Hold] valproate (Depakene) oral solution 1,000 mg, 1,000 mg, oral, BID, Kelvin Hernandez MD    Review of Systems:  14 point review of systems was completed and negative except for those specially mention in my HPI    Physical Exam:    Heart Rate:  []   Temp:  [36.7 °C (98.1 °F)-36.8 °C (98.2 °F)]   Resp:  [12-29]   BP: (117-170)/()   Height:  [182.9 cm (6')-182.9 cm (6' 0.01\")]   Weight:  [74 kg (163 lb 2.3 oz)-81.6 kg (180 lb)]   SpO2:  [81 %-97 %]     Physical Exam  Constitutional:       General: He is awake.      Comments: Agitated   HENT:      Head: Normocephalic.      Comments: Laceration to righth eyebrow sutured, open to air  Eyes:      Extraocular Movements: Extraocular movements intact.      Conjunctiva/sclera: Conjunctivae normal.   Cardiovascular:      Rate and Rhythm: Normal rate and regular rhythm.   Pulmonary:      Breath sounds: Normal breath sounds and air entry.   Abdominal:      General: Bowel sounds are normal.      Palpations: Abdomen is soft.   Musculoskeletal:      Cervical back: Normal range of " motion and neck supple.      Comments: 5/5 strength to all extremeties   Skin:     General: Skin is warm and dry.      Capillary Refill: Capillary refill takes less than 2 seconds.   Neurological:      Mental Status: He is disoriented.   Psychiatric:         Behavior: Behavior is uncooperative.         Objective:    I have reviewed all medications, laboratory results, and imaging pertinent for today's encounter.           Intake/Output Summary (Last 24 hours) at 2/23/2024 2055  Last data filed at 2/23/2024 2039  Gross per 24 hour   Intake --   Output 450 ml   Net -450 ml       Results for orders placed or performed during the hospital encounter of 02/23/24 (from the past 24 hour(s))   POCT GLUCOSE   Result Value Ref Range    POCT Glucose 76 74 - 99 mg/dL   CBC and Auto Differential   Result Value Ref Range    WBC 5.0 4.4 - 11.3 x10*3/uL    nRBC 0.0 0.0 - 0.0 /100 WBCs    RBC 3.69 (L) 4.50 - 5.90 x10*6/uL    Hemoglobin 11.2 (L) 13.5 - 17.5 g/dL    Hematocrit 35.7 (L) 41.0 - 52.0 %    MCV 97 80 - 100 fL    MCH 30.4 26.0 - 34.0 pg    MCHC 31.4 (L) 32.0 - 36.0 g/dL    RDW 16.8 (H) 11.5 - 14.5 %    Platelets 144 (L) 150 - 450 x10*3/uL    Neutrophils % 49.7 40.0 - 80.0 %    Immature Granulocytes %, Automated 0.4 0.0 - 0.9 %    Lymphocytes % 30.9 13.0 - 44.0 %    Monocytes % 15.0 2.0 - 10.0 %    Eosinophils % 3.6 0.0 - 6.0 %    Basophils % 0.4 0.0 - 2.0 %    Neutrophils Absolute 2.48 1.20 - 7.70 x10*3/uL    Immature Granulocytes Absolute, Automated 0.02 0.00 - 0.70 x10*3/uL    Lymphocytes Absolute 1.54 1.20 - 4.80 x10*3/uL    Monocytes Absolute 0.75 0.10 - 1.00 x10*3/uL    Eosinophils Absolute 0.18 0.00 - 0.70 x10*3/uL    Basophils Absolute 0.02 0.00 - 0.10 x10*3/uL   Comprehensive metabolic panel   Result Value Ref Range    Glucose 95 65 - 99 mg/dL    Sodium 143 133 - 145 mmol/L    Potassium 4.2 3.4 - 5.1 mmol/L    Chloride 108 (H) 97 - 107 mmol/L    Bicarbonate 29 24 - 31 mmol/L    Urea Nitrogen 9 8 - 25 mg/dL     Creatinine 0.60 0.40 - 1.60 mg/dL    eGFR >90 >60 mL/min/1.73m*2    Calcium 9.7 8.5 - 10.4 mg/dL    Albumin 3.2 (L) 3.5 - 5.0 g/dL    Alkaline Phosphatase 104 35 - 125 U/L    Total Protein 6.4 5.9 - 7.9 g/dL    AST 23 5 - 40 U/L    Bilirubin, Total 0.2 0.1 - 1.2 mg/dL    ALT 14 5 - 40 U/L    Anion Gap 6 <=19 mmol/L   Magnesium   Result Value Ref Range    Magnesium 2.00 1.60 - 3.10 mg/dL   BLOOD GAS LACTIC ACID, VENOUS   Result Value Ref Range    POCT Lactate, Venous 1.4 0.4 - 2.0 mmol/L   Valproic acid level, total   Result Value Ref Range    Valproic Acid 32 (L) 50 - 100 ug/mL   ECG 12 lead   Result Value Ref Range    Ventricular Rate 59 BPM    Atrial Rate 59 BPM    NC Interval 132 ms    QRS Duration 82 ms    QT Interval 404 ms    QTC Calculation(Bazett) 399 ms    P Axis 51 degrees    R Axis 60 degrees    T Axis 17 degrees    QRS Count 10 beats    Q Onset 221 ms    P Onset 155 ms    P Offset 207 ms    T Offset 423 ms    QTC Fredericia 401 ms   Urinalysis with Reflex Culture and Microscopic   Result Value Ref Range    Color, Urine Colorless (N) Light-Yellow, Yellow, Dark-Yellow    Appearance, Urine Clear Clear    Specific Gravity, Urine 1.007 1.005 - 1.035    pH, Urine 7.5 5.0, 5.5, 6.0, 6.5, 7.0, 7.5, 8.0    Protein, Urine NEGATIVE NEGATIVE, 10 (TRACE), 20 (TRACE) mg/dL    Glucose, Urine Normal Normal mg/dL    Blood, Urine NEGATIVE NEGATIVE    Ketones, Urine NEGATIVE NEGATIVE mg/dL    Bilirubin, Urine NEGATIVE NEGATIVE    Urobilinogen, Urine Normal Normal mg/dL    Nitrite, Urine NEGATIVE NEGATIVE    Leukocyte Esterase, Urine NEGATIVE NEGATIVE   Drug Screen, Urine   Result Value Ref Range    Amphetamine Screen, Urine Negative      Barbiturate Screen, Urine Negative      Benzodiazepines Screen, Urine Negative      Cannabinoid Screen, Urine Negative      Cocaine Metabolite Screen, Urine Negative      Fentanyl Screen, Urine Negative       Methadone Screen, Urine Negative      Opiate Screen, Urine Negative       Oxycodone Screen, Urine Negative      PCP Screen, Urine Negative     Ammonia   Result Value Ref Range    Ammonia 25 12 - 45 umol/L   aPTT   Result Value Ref Range    aPTT 27.5 22.0 - 32.5 seconds   Protime-INR   Result Value Ref Range    Protime 13.2 (H) 9.3 - 12.7 seconds    INR 1.3 (H) 0.9 - 1.2       Assessment/Plan:    I am currently managing this critically ill patient for the following problems:    Neuro/Psych/Pain Ctrl/Sedation:  #Schizophrenia  #Altered mental status  #Aute delirium/agitation  #Fall     -Trauma initially on consult, signed off, no acute traumatic injuries  -Violent restraints for patient/staff safety, continue to reassess need  -Sitter at bedside   -Neuro checks per protocol  -Psychiatry on consult, patient lacks capacity to leave AMA currently  -Valproic acid level low at 32 on admission  -Change home dose valproic acid to IV  -Haldol 10mg IM BID   -PRN Benadryl for agitation  -Patient takes Invega injections, okay to change to risperidone 1mg BID per psychiatry to substitue. Invega not available  -Patient is alert only to self  -Pain control: PRN acetaminophen and IV Ketorolac  -Neurology on consult, appreciate recs  -Monitor CAM-ICU    Respiratory/ENT:  -Currently on RA  -Continuous pulse oximetry, maintain SPO2>90%  -OOB as behavior tolerates    Cardiovascular:  #Syncope  #HTN  -Continuous cardiac monitoring  -Maintain goal MAP>65  -PRN labetolol for SBP>180  -Check troponin  -Home medications on hold  -ECHO and carotid ordered  -Monitor and optimize electrolytes, keep K>4, Mg>2  -Home medications    GI:  #Hx of multiple hernia/abdominal surgeries, most recent 1/17 @[facility]    -Diet: NPO with sips/meds/ice chips  -Last BM: unknown    Renal/Volume Status (Intra & Extravascular):  -Renal function: WNL  -Daily CMP  -Replace electrolytes PRN    Endocrine  -Monitor blood glucose with daily labs and PRN   -Goal BS<180  -Hypoglycemic protocol    Infectious Disease:  -Tmax 36.8  -WBC  WNL    Heme/Onc:  -HH: 11.2/35.7  -No current s/s of active bleeding, CTM  -Daily CBC    Derm/MSK:  #Rt eyebrow laceration   -Sutured in ED, open to air, remove sutures in 5-7 days per trauma note  -Dry and intact, continue to monitor  -PT/OT when cooperative with care    Ethics/Code Status:  -Full code    :  DVT Prophylaxis: none  GI Prophylaxis: none  Bowel Regimen: none  Diet: NPO except sips of water/ice chips/meds  CVC: no  Gilchrist: no  Nava: no  Restraints: yes/violent restraints  Dispo: ICU    Plan discussed with: Dr Blandon  Critical Care Time:  70  [facility]  ANAMARIA Delaney-CNP

## 2024-02-24 NOTE — NURSING NOTE
Pt becoming beligerant and verbally abusive to the staff. Pt still not sure where he is and thought he was still at [facility]. Haldol given with security at bedside and pt tolerated well.

## 2024-02-24 NOTE — PROGRESS NOTES
Occupational Therapy                 Therapy Communication Note    Patient Name: Delta Phoenix  MRN: [id number]  Today's Date: 2/24/2024     Discipline: Occupational Therapy    Missed Visit Reason: Missed Visit Reason:  (Pt in 4 point restraints with security present, not appropriate per nursing for therapy evaluation this date due to combativeness/agitation .)    Missed Time: Attempt    Comment:

## 2024-02-24 NOTE — CONSULTS
Reason For Consult  Altered mental status    History Of Present Illness  Delta Phoenix is a 53 y.o. male presenting with past medical history of schizophrenia, alcohol abuse presented to the ED from psychiatric facility for altered mental status Most of the history obtained from EMR charting.  As per EMR charting patient last well-known was Wednesday when he arrived to the psychiatric facility [facility] for further management of schizophrenia and hyperactive delirium.  Patient experienced questionable syncopal episode versus mechanical falls over there scented to the ED for further evaluation.  In ED patient GCS was 14, vital signs stable, NIHSS 2 due to dysarthria with no apparent focal deficit, low suspicious for TIA/stroke.  Patient had CT head without contrast which showed no acute intracranial abnormality and CT cervical spine without contrast showed no acute pathology.  Patient was admitted for further workup and neurology consulted     Past Medical History  He has a past medical history of Hernia, abdominal, Hypertension, Left fibular fracture, and Schizophrenia (CMS/[facility]).    Surgical History  He has a past surgical history that includes Colon surgery and Hernia repair.     Social History  He reports that he has been smoking cigarettes. He does not have any smokeless tobacco history on file. He reports that he does not currently use alcohol. No history on file for drug use.    Family History  No family history on file.     Allergies  Lorazepam, Ziprasidone hcl, Aspirin, and Poison ivy extract    Review of Systems  Review of system could not be obtained due to patient's altered mentation     Physical Exam  Patient lying comfortably in bed in restraints, has bruise over his right eye and laceration on right eyebrow.  Patient opened his eyes on calling out his name loudly however physical exam was limited as patient does receive Haldol an hour prior to my examination.  As per the nursing staff patient  "is awake alert, calm, noted to move all extremities spontaneously.       Last Recorded Vitals  Blood pressure 164/61, pulse (!) 116, temperature 38 °C (100.4 °F), resp. rate 16, height 1.829 m (6' 0.01\"), weight 74 kg (163 lb 2.3 oz), SpO2 95 %.    Relevant Results    CT head without contrast  IMPRESSION:  No CT evidence for acute intracranial pathology.      Signed by: Tristin Hernandez 2/23/2024 9:30 AM  Dictation workstation:   [id number]       CT cervical spine without contrast  MPRESSION:  1. No acute fracture or spondylolisthesis.  2. Degenerative disc disease and spondylosis as described in the body  of the report.          Signed by: Tristin Hernandez 2/23/2024 9:32 AM  Dictation workstation:   [id number]    Assessment/Plan   History of schizophrenia and hyperactive delirium transferred to the ED for evaluation of mechanical fall versus syncopal episode versus altered mental status.  Unclear as to what exactly happened at the facility, as per nursing staff family this patient has not been eating well at the facility for the past few days has lost significant weight, unclear if patient has been taking his meds as well.  CT head without contrast showed no acute intracranial pathology, CT cervical spine showed no acute fracture.  Patient currently in 1 S2 1 sitter and restraint drop psychiatry on board  Basic lab work including CBC, CMP, lactate within normal limits.  Urinalysis negative for infection, urine drug screen negative for illicit drug use stop ammonia within normal limits  Patient valproic acid level 32 continue Depakote 1000 mg twice daily, Depakote trough level obtained prior to a.m. dose, continue Haldol 10 mg nightly as per psych recommendations.  Continue monitoring electrolytes, IV hydration and psych recommendation  PT consult  Continue to observe the patient for the next 24 to 48 hours if no improvement, please call neurology for evaluation.        I spent  60 minutes in the professional " and overall care of this patient.      Fany Peterson MD

## 2024-02-24 NOTE — PROGRESS NOTES
[facility] Critical Care Medicine       Date:  2/24/2024  Patient:  Delta Phoenix  YOB: 1970  MRN:  [id number]   Admit Date:  2/23/2024  ========================================================================================================    No chief complaint on file.  History of Present Illness:  Delta Phoenix is a 53 y.o. year old male patient with Past Medical History of  schizophrenia, ETOH abuse, multiple abdominal/hernia repair surgeries with most recent 1/17 at [facility].  He was sent to [facility] and discharged to [facility] 2/20.  He presented to ED today via EMS after a fall this morning.  This was not witnessed. Unknown if it was a possible syncopal episode vs mechanical fall.  He presented with a laceration to right eyebrow which was sutured in ED. Tetanus vaccine given. Vitals stable and labs unremarkable in ED. CT head and CT cervical spine showed nothing acute.  Patient became combative and violent with ED staff requiring 4 point restraints to be applied.  IM haldol and IM benadryl given. Patient was admitted to ICU for further management.      Interval ICU Events:  2/24: Patient remains intermittently combative and uncooperative. HDS. Awaiting Echo and carotid results.  Seen by psychiatry yesterday.      Medical History:  Past Medical History:   Diagnosis Date    Hernia, abdominal     Hypertension     Left fibular fracture     Schizophrenia (CMS/HCC)      Past Surgical History:   Procedure Laterality Date    COLON SURGERY      HERNIA REPAIR       Medications Prior to Admission   Medication Sig Dispense Refill Last Dose    amLODIPine (Norvasc) 5 mg tablet Take 2 tablets (10 mg) by mouth once daily.       benztropine (Cogentin) 1 mg tablet Take 1 tablet (1 mg) by mouth 2 times a day.       clotrimazole (Lotrimin) 1 % cream Apply 1 Application topically 2 times a day.       haloperidol (Haldol) 10 mg tablet Take 1 tablet (10 mg) by mouth 2 times a day.       melatonin 3  mg tablet Take 2 tablets (6 mg) by mouth once daily at bedtime.       metoprolol tartrate (Lopressor) 50 mg tablet Take 1 tablet by mouth 2 times a day.       nicotine (Nicoderm CQ) 21 mg/24 hr patch Place 1 patch on the skin once every 24 hours.       valproate (Depakene) 250 mg/5 mL oral solution Take 20 mL (1,000 mg) by mouth 2 times a day.        Lorazepam, Ziprasidone hcl, Aspirin, and Poison ivy extract  Social History     Tobacco Use    Smoking status: Every Day     Types: Cigarettes   Substance Use Topics    Alcohol use: Not Currently     No family history on file.    Hospital Medications:           Current Facility-Administered Medications:     acetaminophen (Tylenol) tablet 975 mg, 975 mg, oral, q6h PRN, ANAMARIA Delaney-CNP, 975 mg at 02/24/24 0727    amLODIPine (Norvasc) tablet 10 mg, 10 mg, oral, Daily, ANAMARIA Delaney-KARUNA    diphenhydrAMINE (BENADryl) injection 25 mg, 25 mg, intravenous, q6h PRN, ANAMARIA Delaney-CNP    [MAR Hold] haloperidol (Haldol) tablet 10 mg, 10 mg, oral, BID, Kelvin Hernandez MD    haloperidol lactate (Haldol) injection 10 mg, 10 mg, intramuscular, BID, ANAMARIA Delaney-CNP, 10 mg at 02/24/24 0851    ketorolac (Toradol) injection 30 mg, 30 mg, intravenous, q6h PRN, ANAMARIA Delaney-CNP, 30 mg at 02/24/24 1231    labetaloL (Normodyne,Trandate) injection 10 mg, 10 mg, intravenous, q4h PRN, ANAMARIA Delaney-CNP    [MAR Hold] melatonin tablet 6 mg, 6 mg, oral, Nightly, Kelvin Hernandez MD    metoprolol tartrate (Lopressor) tablet 50 mg, 50 mg, oral, BID, ANAMARIA Delaney-CNP    [MAR Hold] nicotine (Nicoderm CQ) 21 mg/24 hr patch 1 patch, 1 patch, transdermal, q24h, Kelvin Hernandez MD, 1 patch at 02/23/24 1401    perflutren lipid microspheres (Definity) injection 0.5-10 mL of dilution, 0.5-10 mL of dilution, intravenous, Once in imaging, ANAMARIA Delaney-CNP    perflutren protein A microsphere (Optison) injection 0.5 mL, 0.5 mL,  "intravenous, Once in imaging, Roseann Mackey APRMARIBEL-CNP    risperiDONE (RisperDAL) tablet 1 mg, 1 mg, oral, BID, CARMEN Delaney, 1 mg at 02/23/24 2104    sulfur hexafluoride microsphr (Lumason) injection 24.28 mg, 2 mL, intravenous, Once in imaging, Roseann Mcnallywamaribel ANAMARIA-CNP    valproate (Depacon) 500 mg in dextrose 5 % in water (D5W) 50 mL IV, 500 mg, intravenous, q6h, CARMEN Delaney, Last Rate: 50 mL/hr at 02/24/24 1426, 500 mg at 02/24/24 1426    Review of Systems:  14 point review of systems was completed and negative except for those specially mention in my HPI    Physical Exam:    Heart Rate:  []   Temp:  [36.6 °C (97.9 °F)-38.5 °C (101.3 °F)]   Resp:  [13-23]   BP: (108-169)/()   Height:  [182.9 cm (6' 0.01\")]   Weight:  [74 kg (163 lb 2.3 oz)]   SpO2:  [91 %-97 %]     Physical Exam  Constitutional:       General: He is awake.   Eyes:      Extraocular Movements: Extraocular movements intact.      Pupils: Pupils are equal, round, and reactive to light.   Cardiovascular:      Rate and Rhythm: Normal rate and regular rhythm.      Pulses:           Radial pulses are 2+ on the right side and 2+ on the left side.        Dorsalis pedis pulses are 2+ on the right side and 2+ on the left side.   Pulmonary:      Effort: Pulmonary effort is normal.      Breath sounds: Normal breath sounds.   Musculoskeletal:      Cervical back: Normal range of motion and neck supple.      Comments: 5/5 strength to all extremities   Skin:     General: Skin is warm and dry.      Capillary Refill: Capillary refill takes less than 2 seconds.      Comments: Laceration to right eyebrow sutured, dry, and intact   Neurological:      GCS: GCS eye subscore is 4. GCS verbal subscore is 4. GCS motor subscore is 5.   Psychiatric:         Mood and Affect: Mood is anxious. Affect is angry.         Behavior: Behavior is uncooperative.         Objective:    I have reviewed all medications, laboratory results, and " imaging pertinent for today's encounter.           Intake/Output Summary (Last 24 hours) at 2/24/2024 1438  Last data filed at 2/24/2024 1300  Gross per 24 hour   Intake 1350 ml   Output 1150 ml   Net 200 ml       Results for orders placed or performed during the hospital encounter of 02/23/24 (from the past 24 hour(s))   POCT GLUCOSE   Result Value Ref Range    POCT Glucose 94 74 - 99 mg/dL   Transthoracic Echo (TTE) Complete   Result Value Ref Range    BSA 1.94 m2       Assessment/Plan:    I am currently managing this critically ill patient for the following problems:    Neuro/Psych/Pain Ctrl/Sedation:  #Schizophrenia  #Altered mental status  #Aute delirium/agitation  #Fall      -Trauma initially on consult, signed off, no acute traumatic injuries  -Violent restraints for patient/staff safety, continue to reassess need  -Sitter at bedside   -Neuro checks per protocol  -Psychiatry on consult, patient lacks capacity to leave AMA currently  -Valproic acid level low at 32 on admission  -Change home dose valproic acid to IV  -Haldol 10mg IM BID   -PRN Benadryl for agitation  -Patient takes Invega injections, okay to change to risperidone 1mg BID per psychiatry to substitue. Invega not available  -Patient is alert only to self  -Pain control: PRN acetaminophen and IV Ketorolac  -Neurology on consult, appreciate recs  -Monitor CAM-ICU     Respiratory/ENT:  -Currently on RA  -Continuous pulse oximetry, maintain SPO2>90%  -OOB as behavior tolerates     Cardiovascular:  #Syncope  #HTN  -Continuous cardiac monitoring  -Maintain goal MAP>65  -Restart home metoprolol and amlodipine  -PRN labetolol for SBP>180  -ECHO and carotid pending  -Monitor and optimize electrolytes, keep K>4, Mg>2     GI:  #Hx of multiple hernia/abdominal surgeries, most recent 1/17 @[facility]     -Diet: Regular  -Last BM: unknown     Renal/Volume Status (Intra & Extravascular):  -Renal function: WNL  -Daily CMP  -Replace electrolytes PRN      Endocrine  -Monitor blood glucose with daily labs and PRN   -Goal BS<180  -Hypoglycemic protocol     Infectious Disease:  -Tmax 38 x's 1, CTM  -No s/s of infection  -WBC WNL     Heme/Onc:  -HH: 11.2/35.7  -No current s/s of active bleeding, CTM  -Daily CBC     Derm/MSK:  #Rt eyebrow laceration   -Sutured in ED 2/23, open to air, remove sutures in 5-7 days per trauma note  -Dry and intact, continue to monitor  -PT/OT when cooperative with care     Ethics/Code Status:  -Full code     :  DVT Prophylaxis: none  GI Prophylaxis: none  Bowel Regimen: none  Diet: Regular  CVC: no  Alie: no  Nava: no  Restraints: yes/violent restraints  Dispo: ICU    Plan discussed with: Dr Patterson  Critical Care Time:  40  [facility] Critical [facility]  ANAMARIA Delaney-CNP

## 2024-02-24 NOTE — NURSING NOTE
Right ankle restraint removed due to pts behavior gettibg better. Pt unaware where he is at still thinks he is at [facility]. Pt instructed that if he starts to get violent the restraint will be placed back on. Pt verbalized understanding.

## 2024-02-24 NOTE — PROGRESS NOTES
Physical Therapy                 Therapy Communication Note    Patient Name: Delta Phoenix  MRN: [id number]  Today's Date: 2/24/2024     Discipline: Physical Therapy    Missed Visit Reason: Cancel, Other (Comment)   (Pt in 4-point restraints with security present, not appropriate per nursing for therapy evaluation this date due to combativeness/agitation.)    Missed Time: Cancel    Comment:

## 2024-02-25 LAB
ANION GAP SERPL CALC-SCNC: 11 MMOL/L
ATRIAL RATE: 59 BPM
BUN SERPL-MCNC: 20 MG/DL (ref 8–25)
CALCIUM SERPL-MCNC: 8.9 MG/DL (ref 8.5–10.4)
CHLORIDE SERPL-SCNC: 99 MMOL/L (ref 97–107)
CO2 SERPL-SCNC: 22 MMOL/L (ref 24–31)
CREAT SERPL-MCNC: 1 MG/DL (ref 0.4–1.6)
EGFRCR SERPLBLD CKD-EPI 2021: 89 ML/MIN/1.73M*2
GLUCOSE SERPL-MCNC: 106 MG/DL (ref 65–99)
HOLD SPECIMEN: NORMAL
MAGNESIUM SERPL-MCNC: 2 MG/DL (ref 1.6–3.1)
P AXIS: 51 DEGREES
P OFFSET: 207 MS
P ONSET: 155 MS
PHOSPHATE SERPL-MCNC: 4.1 MG/DL (ref 2.5–4.5)
POTASSIUM SERPL-SCNC: 4.7 MMOL/L (ref 3.4–5.1)
PR INTERVAL: 132 MS
Q ONSET: 221 MS
QRS COUNT: 10 BEATS
QRS DURATION: 82 MS
QT INTERVAL: 404 MS
QTC CALCULATION(BAZETT): 399 MS
QTC FREDERICIA: 401 MS
R AXIS: 60 DEGREES
SODIUM SERPL-SCNC: 132 MMOL/L (ref 133–145)
T AXIS: 17 DEGREES
T OFFSET: 423 MS
VENTRICULAR RATE: 59 BPM

## 2024-02-25 PROCEDURE — 36415 COLL VENOUS BLD VENIPUNCTURE: CPT

## 2024-02-25 PROCEDURE — 2500000004 HC RX 250 GENERAL PHARMACY W/ HCPCS (ALT 636 FOR OP/ED)

## 2024-02-25 PROCEDURE — 97161 PT EVAL LOW COMPLEX 20 MIN: CPT | Mod: GP

## 2024-02-25 PROCEDURE — 97116 GAIT TRAINING THERAPY: CPT | Mod: GP

## 2024-02-25 PROCEDURE — 2500000005 HC RX 250 GENERAL PHARMACY W/O HCPCS

## 2024-02-25 PROCEDURE — 80048 BASIC METABOLIC PNL TOTAL CA: CPT

## 2024-02-25 PROCEDURE — 2500000002 HC RX 250 W HCPCS SELF ADMINISTERED DRUGS (ALT 637 FOR MEDICARE OP, ALT 636 FOR OP/ED): Mod: MUE

## 2024-02-25 PROCEDURE — 83735 ASSAY OF MAGNESIUM: CPT

## 2024-02-25 PROCEDURE — 2500000001 HC RX 250 WO HCPCS SELF ADMINISTERED DRUGS (ALT 637 FOR MEDICARE OP): Performed by: INTERNAL MEDICINE

## 2024-02-25 PROCEDURE — 2500000001 HC RX 250 WO HCPCS SELF ADMINISTERED DRUGS (ALT 637 FOR MEDICARE OP)

## 2024-02-25 PROCEDURE — 84100 ASSAY OF PHOSPHORUS: CPT

## 2024-02-25 PROCEDURE — 1210000001 HC SEMI-PRIVATE ROOM DAILY

## 2024-02-25 RX ORDER — MICONAZOLE NITRATE 2 %
4 CREAM (GRAM) TOPICAL EVERY 2 HOUR PRN
Status: DISCONTINUED | OUTPATIENT
Start: 2024-02-25 | End: 2024-02-27

## 2024-02-25 RX ORDER — IBUPROFEN 400 MG/1
400 TABLET ORAL EVERY 6 HOURS PRN
Status: DISCONTINUED | OUTPATIENT
Start: 2024-02-25 | End: 2024-02-28 | Stop reason: HOSPADM

## 2024-02-25 RX ORDER — OSELTAMIVIR PHOSPHATE 75 MG/1
75 CAPSULE ORAL 2 TIMES DAILY
Status: DISCONTINUED | OUTPATIENT
Start: 2024-02-25 | End: 2024-02-28 | Stop reason: HOSPADM

## 2024-02-25 RX ADMIN — KETOROLAC TROMETHAMINE 30 MG: 30 INJECTION, SOLUTION INTRAMUSCULAR; INTRAVENOUS at 05:45

## 2024-02-25 RX ADMIN — DEXTROSE MONOHYDRATE 500 MG: 50 INJECTION, SOLUTION INTRAVENOUS at 14:16

## 2024-02-25 RX ADMIN — NICOTINE POLACRILEX 4 MG: 2 GUM, CHEWING BUCCAL at 15:58

## 2024-02-25 RX ADMIN — DEXTROSE MONOHYDRATE 500 MG: 50 INJECTION, SOLUTION INTRAVENOUS at 20:18

## 2024-02-25 RX ADMIN — DEXTROSE MONOHYDRATE 500 MG: 50 INJECTION, SOLUTION INTRAVENOUS at 09:17

## 2024-02-25 RX ADMIN — Medication 6 MG: at 20:21

## 2024-02-25 RX ADMIN — RISPERIDONE 1 MG: 1 TABLET, FILM COATED ORAL at 20:22

## 2024-02-25 RX ADMIN — METOPROLOL TARTRATE 50 MG: 50 TABLET ORAL at 20:21

## 2024-02-25 RX ADMIN — IBUPROFEN 400 MG: 400 TABLET, FILM COATED ORAL at 11:12

## 2024-02-25 RX ADMIN — HALOPERIDOL LACTATE 10 MG: 5 INJECTION, SOLUTION INTRAMUSCULAR at 20:17

## 2024-02-25 RX ADMIN — ACETAMINOPHEN 975 MG: 325 TABLET ORAL at 09:25

## 2024-02-25 RX ADMIN — HALOPERIDOL LACTATE 10 MG: 5 INJECTION, SOLUTION INTRAMUSCULAR at 09:17

## 2024-02-25 RX ADMIN — OSELTAMIVIR PHOSPHATE 75 MG: 75 CAPSULE ORAL at 20:21

## 2024-02-25 SDOH — SOCIAL STABILITY: SOCIAL INSECURITY: WERE YOU ABLE TO COMPLETE ALL THE BEHAVIORAL HEALTH SCREENINGS?: YES

## 2024-02-25 SDOH — SOCIAL STABILITY: SOCIAL INSECURITY: POSSIBLE ABUSE REPORTED TO:: OTHER (COMMENT)

## 2024-02-25 ASSESSMENT — COLUMBIA-SUICIDE SEVERITY RATING SCALE - C-SSRS
6. HAVE YOU EVER DONE ANYTHING, STARTED TO DO ANYTHING, OR PREPARED TO DO ANYTHING TO END YOUR LIFE?: NO
1. IN THE PAST MONTH, HAVE YOU WISHED YOU WERE DEAD OR WISHED YOU COULD GO TO SLEEP AND NOT WAKE UP?: NO
2. HAVE YOU ACTUALLY HAD ANY THOUGHTS OF KILLING YOURSELF?: NO

## 2024-02-25 ASSESSMENT — COGNITIVE AND FUNCTIONAL STATUS - GENERAL
CLIMB 3 TO 5 STEPS WITH RAILING: A LITTLE
STANDING UP FROM CHAIR USING ARMS: A LITTLE
MOBILITY SCORE: 20
DAILY ACTIVITIY SCORE: 22
MOBILITY SCORE: 20
CLIMB 3 TO 5 STEPS WITH RAILING: A LITTLE
HELP NEEDED FOR BATHING: A LITTLE
DRESSING REGULAR LOWER BODY CLOTHING: A LITTLE
MOVING TO AND FROM BED TO CHAIR: A LITTLE
STANDING UP FROM CHAIR USING ARMS: A LITTLE
WALKING IN HOSPITAL ROOM: A LITTLE
WALKING IN HOSPITAL ROOM: A LITTLE
MOVING TO AND FROM BED TO CHAIR: A LITTLE

## 2024-02-25 ASSESSMENT — PAIN SCALES - GENERAL
PAINLEVEL_OUTOF10: 3
PAINLEVEL_OUTOF10: 3
PAINLEVEL_OUTOF10: 9
PAINLEVEL_OUTOF10: 0 - NO PAIN
PAINLEVEL_OUTOF10: 7

## 2024-02-25 ASSESSMENT — PAIN - FUNCTIONAL ASSESSMENT
PAIN_FUNCTIONAL_ASSESSMENT: FLACC (FACE, LEGS, ACTIVITY, CRY, CONSOLABILITY)
PAIN_FUNCTIONAL_ASSESSMENT: 0-10
PAIN_FUNCTIONAL_ASSESSMENT: 0-10
PAIN_FUNCTIONAL_ASSESSMENT: FLACC (FACE, LEGS, ACTIVITY, CRY, CONSOLABILITY)
PAIN_FUNCTIONAL_ASSESSMENT: 0-10

## 2024-02-25 ASSESSMENT — PAIN DESCRIPTION - LOCATION
LOCATION: HEAD
LOCATION: HEAD

## 2024-02-25 ASSESSMENT — ACTIVITIES OF DAILY LIVING (ADL): LACK_OF_TRANSPORTATION: PATIENT DECLINED

## 2024-02-25 NOTE — PROGRESS NOTES
[facility] Critical Care Medicine       Date:  2/25/2024  Patient:  Delta Phoenix  YOB: 1970  MRN:  [id number]   Admit Date:  2/23/2024  ========================================================================================================    No chief complaint on file.  History of Present Illness:  Delta Phoenix is a 53 y.o. year old male patient with Past Medical History of  schizophrenia, ETOH abuse, multiple abdominal/hernia repair surgeries with most recent 1/17 at [facility].  He was sent to [facility] and discharged to [facility] 2/20.  He presented to ED today via EMS after a fall this morning.  This was not witnessed. Unknown if it was a possible syncopal episode vs mechanical fall.  He presented with a laceration to right eyebrow which was sutured in ED. Tetanus vaccine given. Vitals stable and labs unremarkable in ED. CT head and CT cervical spine showed nothing acute.  Patient became combative and violent with ED staff requiring 4 point restraints to be applied.  IM haldol and IM benadryl given. Patient was admitted to ICU for further management.      Interval ICU Events:  2/24: Patient remains intermittently combative and uncooperative. HDS. Awaiting Echo and carotid results.  Seen by psychiatry yesterday.      2/25: Patient much more calm and cooperative today.  Started becoming febrile and coughing yesterday.  Positive for influeza A.  Tamiflu started.  Still needs medically cleared.  Stable to transfer to [facility].    Medical History:  Past Medical History:   Diagnosis Date    Hernia, abdominal     Hypertension     Left fibular fracture     Schizophrenia (CMS/HCC)      Past Surgical History:   Procedure Laterality Date    COLON SURGERY      HERNIA REPAIR       Medications Prior to Admission   Medication Sig Dispense Refill Last Dose    amLODIPine (Norvasc) 5 mg tablet Take 2 tablets (10 mg) by mouth once daily.       benztropine (Cogentin) 1 mg tablet Take 1 tablet (1 mg) by  mouth 2 times a day.       clotrimazole (Lotrimin) 1 % cream Apply 1 Application topically 2 times a day.       haloperidol (Haldol) 10 mg tablet Take 1 tablet (10 mg) by mouth 2 times a day.       melatonin 3 mg tablet Take 2 tablets (6 mg) by mouth once daily at bedtime.       metoprolol tartrate (Lopressor) 50 mg tablet Take 1 tablet by mouth 2 times a day.       nicotine (Nicoderm CQ) 21 mg/24 hr patch Place 1 patch on the skin once every 24 hours.       valproate (Depakene) 250 mg/5 mL oral solution Take 20 mL (1,000 mg) by mouth 2 times a day.        Lorazepam, Ziprasidone hcl, Aspirin, and Poison ivy extract  Social History     Tobacco Use    Smoking status: Every Day     Types: Cigarettes   Substance Use Topics    Alcohol use: Not Currently     No family history on file.    Hospital Medications:           Current Facility-Administered Medications:     acetaminophen (Tylenol) tablet 975 mg, 975 mg, oral, q6h PRN, ANAMARIA Delaney-CNP, 975 mg at 02/25/24 0925    amLODIPine (Norvasc) tablet 10 mg, 10 mg, oral, Daily, Roseann Mackey APRN-CNP    diphenhydrAMINE (BENADryl) injection 25 mg, 25 mg, intravenous, q6h PRN, Roseann Mackey APRN-CNP    haloperidol lactate (Haldol) injection 10 mg, 10 mg, intramuscular, BID, Roseann Mackey APRN-CNP, 10 mg at 02/25/24 0917    ketorolac (Toradol) injection 30 mg, 30 mg, intravenous, q6h PRN, Roseann Mackey APRN-CNP, 30 mg at 02/25/24 0545    labetaloL (Normodyne,Trandate) injection 10 mg, 10 mg, intravenous, q4h PRN, Roseann Mackey APRN-CNP    melatonin tablet 6 mg, 6 mg, oral, Nightly, Roseann Mackey APRN-CNP, 6 mg at 02/24/24 2200    metoprolol tartrate (Lopressor) tablet 50 mg, 50 mg, oral, BID, Roseann Mackey APRN-CNP, 50 mg at 02/24/24 2200    oseltamivir (Tamiflu) capsule 75 mg, 75 mg, oral, BID, Roseann Mackey, APRN-CNP    perflutren lipid microspheres (Definity) injection 0.5-10 mL of dilution, 0.5-10 mL of dilution, intravenous, Once in imaging,  Roseann MARTINEZ CARMEN Mackey    perflutren protein A microsphere (Optison) injection 0.5 mL, 0.5 mL, intravenous, Once in imaging, Roseann MARTINEZ Narendra APRN-CNP    risperiDONE (RisperDAL) tablet 1 mg, 1 mg, oral, BID, Roseann Thompsonihwan, APRN-CNP, 1 mg at 02/24/24 2200    sulfur hexafluoride microsphr (Lumason) injection 24.28 mg, 2 mL, intravenous, Once in imaging, Roseann MARTINEZ CARMEN Mackey    valproate (Depacon) 500 mg in dextrose 5 % in water (D5W) 50 mL IV, 500 mg, intravenous, q6h, Roseann Thompsonjosephnj APRN-CNP, Last Rate: 50 mL/hr at 02/25/24 0917, 500 mg at 02/25/24 0917    Review of Systems:  14 point review of systems was completed and negative except for those specially mention in my HPI    Physical Exam:    Temp:  [37 °C (98.6 °F)-38.9 °C (102 °F)]   BP: (106-144)/(73-94)   SpO2:  [91 %-95 %]     Physical Exam  Constitutional:       General: He is awake.   HENT:      Head: Normocephalic.   Eyes:      Extraocular Movements: Extraocular movements intact.      Pupils: Pupils are equal, round, and reactive to light.   Cardiovascular:      Rate and Rhythm: Normal rate and regular rhythm.      Pulses:           Radial pulses are 2+ on the right side and 2+ on the left side.        Dorsalis pedis pulses are 2+ on the right side and 2+ on the left side.   Pulmonary:      Effort: Pulmonary effort is normal.      Breath sounds: Normal breath sounds.   Musculoskeletal:      Cervical back: Normal range of motion and neck supple.      Right lower leg: No edema.      Left lower leg: No edema.      Comments: 5/5 strength to all extremities   Skin:     General: Skin is warm and dry.      Capillary Refill: Capillary refill takes less than 2 seconds.      Coloration: Skin is not pale.      Comments: Laceration to right eyebrow sutured, dry, and intact   Neurological:      Mental Status: He is alert and oriented to person, place, and time.      GCS: GCS eye subscore is 4. GCS verbal subscore is 5. GCS motor subscore is 6.   Psychiatric:          Behavior: Behavior is cooperative.         Objective:    I have reviewed all medications, laboratory results, and imaging pertinent for today's encounter.           Intake/Output Summary (Last 24 hours) at 2/25/2024 0956  Last data filed at 2/25/2024 0800  Gross per 24 hour   Intake 2164 ml   Output 175 ml   Net 1989 ml         Results for orders placed or performed during the hospital encounter of 02/23/24 (from the past 24 hour(s))   POCT GLUCOSE   Result Value Ref Range    POCT Glucose 94 74 - 99 mg/dL   Transthoracic Echo (TTE) Complete   Result Value Ref Range    BSA 1.94 m2   Comprehensive metabolic panel   Result Value Ref Range    Glucose 115 (H) 65 - 99 mg/dL    Sodium 136 133 - 145 mmol/L    Potassium 4.4 3.4 - 5.1 mmol/L    Chloride 103 97 - 107 mmol/L    Bicarbonate 22 (L) 24 - 31 mmol/L    Urea Nitrogen 10 8 - 25 mg/dL    Creatinine 0.80 0.40 - 1.60 mg/dL    eGFR >90 >60 mL/min/1.73m*2    Calcium 9.7 8.5 - 10.4 mg/dL    Albumin 3.1 (L) 3.5 - 5.0 g/dL    Alkaline Phosphatase 104 35 - 125 U/L    Total Protein 6.4 5.9 - 7.9 g/dL    AST 35 5 - 40 U/L    Bilirubin, Total 0.3 0.1 - 1.2 mg/dL    ALT 21 5 - 40 U/L    Anion Gap 11 <=19 mmol/L   CBC   Result Value Ref Range    WBC 4.6 4.4 - 11.3 x10*3/uL    nRBC 0.0 0.0 - 0.0 /100 WBCs    RBC 3.77 (L) 4.50 - 5.90 x10*6/uL    Hemoglobin 11.3 (L) 13.5 - 17.5 g/dL    Hematocrit 35.4 (L) 41.0 - 52.0 %    MCV 94 80 - 100 fL    MCH 30.0 26.0 - 34.0 pg    MCHC 31.9 (L) 32.0 - 36.0 g/dL    RDW 17.2 (H) 11.5 - 14.5 %    Platelets 147 (L) 150 - 450 x10*3/uL   Sars-CoV-2 and Influenza A/B PCR   Result Value Ref Range    Flu A Result Detected (A) Not Detected    Flu B Result Not Detected Not Detected    Coronavirus 2019, PCR Not Detected Not Detected   Basic metabolic panel   Result Value Ref Range    Glucose 106 (H) 65 - 99 mg/dL    Sodium 132 (L) 133 - 145 mmol/L    Potassium 4.7 3.4 - 5.1 mmol/L    Chloride 99 97 - 107 mmol/L    Bicarbonate 22 (L) 24 - 31 mmol/L     Urea Nitrogen 20 8 - 25 mg/dL    Creatinine 1.00 0.40 - 1.60 mg/dL    eGFR 89 >60 mL/min/1.73m*2    Calcium 8.9 8.5 - 10.4 mg/dL    Anion Gap 11 <=19 mmol/L   Magnesium   Result Value Ref Range    Magnesium 2.00 1.60 - 3.10 mg/dL   Phosphorus   Result Value Ref Range    Phosphorus 4.1 2.5 - 4.5 mg/dL       Assessment/Plan:    I am currently managing this critically ill patient for the following problems:    Neuro/Psych/Pain Ctrl/Sedation:  #Schizophrenia  #Altered mental status  #Aute delirium/agitation  #Fall      -Trauma initially on consult, signed off, no acute traumatic injuries  -Soft wrist restraints  -Sitter at bedside   -Neuro checks per protocol  -Psychiatry on consult, patient lacks capacity to leave AMA currently  -Valproic acid level low at 32 on admission  -Change home dose valproic acid to IV  -Haldol 10mg IM BID   -PRN Benadryl for agitation  -Patient takes Invega injections, okay to change to risperidone 1mg BID per psychiatry to substitue. Invega not available  -Pain control: PRN acetaminophen and Ibuprofen  -Neurology on consult, appreciate recs  -Monitor CAM-ICU     Respiratory/ENT:  -Currently on RA  -Continuous pulse oximetry, maintain SPO2>90%  -OOB as behavior tolerates     Cardiovascular:  #Syncope  #HTN  -Continuous cardiac monitoring  -Maintain goal MAP>65  -Restart home metoprolol and amlodipine  -PRN labetolol for SBP>180  -ECHO and carotid pending  -Monitor and optimize electrolytes, keep K>4, Mg>2     GI:  #Hx of multiple hernia/abdominal surgeries, most recent 1/17 @[facility]     -Diet: Regular  -Last BM: today      Renal/Volume Status (Intra & Extravascular):  -Renal function: WNL  -Daily CMP  -Replace electrolytes PRN     Endocrine  -Monitor blood glucose with daily labs and PRN   -Goal BS<180  -Hypoglycemic protocol     Infectious Disease:  #Influenza A  -Tmax 38.9  -Tamiflu for 5 days  -WBC WNL     Heme/Onc:  -HH: 11.3/35.4  -No current s/s of active bleeding, CTM  -Daily CBC      Derm/MSK:  #Rt eyebrow laceration   -Sutured in ED 2/23, open to air, remove sutures in 5-7 days per trauma note  -Dry and intact, continue to monitor  -PT/OT when cooperative with care     Ethics/Code Status:  -Full code     :  DVT Prophylaxis: none  GI Prophylaxis: none  Bowel Regimen: none  Diet: Regular  CVC: no  Alie: no  Nava: no  Restraints: yes  Dispo: tx to RNF today    Plan discussed with: Dr Patterson  Critical Care Time:  40  [facility]  Roseann Mackey, APRN-CNP

## 2024-02-25 NOTE — CARE PLAN
The patient's goals for the shift include na    The clinical goals for the shift include Pt and staff garland remain safe'    Problem: Safety - Violent/Self-destructive Restraint  Goal: Remains free of injury from restraints (Restraint for Violent/Self-Destructive Behavior)  Outcome: Progressing  Goal: Free from restraints (Restraint for Violent or Self-Destructive Behavior)  Outcome: Progressing     Problem: Pain  Goal: My pain/discomfort is manageable  Outcome: Progressing     Problem: Safety  Goal: Patient will be injury free during hospitalization  Outcome: Progressing  Goal: I will remain free of falls  Outcome: Progressing     Problem: Daily Care  Goal: Daily care needs are met  Outcome: Progressing     Problem: Daily Care  Goal: Daily care needs are met  Outcome: Progressing     Problem: Discharge Barriers  Goal: My discharge needs are met  Outcome: Progressing     Problem: Skin  Goal: Decreased wound size/increased tissue granulation at next dressing change  Outcome: Progressing  Flowsheets (Taken 2/25/2024 0248)  Decreased wound size/increased tissue granulation at next dressing change:   Promote sleep for wound healing   Utilize specialty bed per algorithm   Protective dressings over bony prominences  Goal: Participates in plan/prevention/treatment measures  Outcome: Progressing  Flowsheets (Taken 2/25/2024 0248)  Participates in plan/prevention/treatment measures:   Discuss with provider PT/OT consult   Increase activity/out of bed for meals   Elevate heels  Goal: Prevent/manage excess moisture  Outcome: Progressing  Flowsheets (Taken 2/25/2024 0248)  Prevent/manage excess moisture:   Cleanse incontinence/protect with barrier cream   Moisturize dry skin   Use wicking fabric (obtain order)   Follow provider orders for dressing changes   Monitor for/manage infection if present  Goal: Prevent/minimize sheer/friction injuries  Outcome: Progressing  Flowsheets (Taken 2/25/2024 0248)  Prevent/minimize  sheer/friction injuries:   HOB 30 degrees or less   Complete micro-shifts as needed if patient unable. Adjust patient position to relieve pressure points, not a full turn   Increase activity/out of bed for meals   Turn/reposition every 2 hours/use positioning/transfer devices   Use pull sheet   Utilize specialty bed per algorithm  Goal: Promote/optimize nutrition  Outcome: Progressing  Flowsheets (Taken 2/25/2024 0248)  Promote/optimize nutrition:   Discuss with provider if NPO > 2 days   Assist with feeding   Offer water/supplements/favorite foods   Monitor/record intake including meals   Consume > 50% meals/supplements   Reassess MST if dietician not consulted  Goal: Promote skin healing  Outcome: Progressing  Flowsheets (Taken 2/25/2024 0248)  Promote skin healing:   Assess skin/pad under line(s)/device(s)   Ensure correct size (line/device) and apply per  instructions   Rotate device position/do not position patient on device   Turn/reposition every 2 hours/use positioning/transfer devices   Protective dressings over bony prominences     Problem: Fall/Injury  Goal: Not fall by end of shift  Outcome: Progressing  Goal: Be free from injury by end of the shift  Outcome: Progressing  Goal: Verbalize understanding of personal risk factors for fall in the hospital  Outcome: Progressing  Goal: Verbalize understanding of risk factor reduction measures to prevent injury from fall in the home  Outcome: Progressing  Goal: Use assistive devices by end of the shift  Outcome: Progressing  Goal: Pace activities to prevent fatigue by end of the shift  Outcome: Progressing

## 2024-02-25 NOTE — NURSING NOTE
Bilateral wrist restraints removed and pt instructed if he becomes violent or tries to hurt himself the restraints will be placed back on. Pt verbalized understanding

## 2024-02-25 NOTE — PROGRESS NOTES
Physical Therapy    Physical Therapy Evaluation & Treatment    Patient Name: Delta Phoenix  MRN: [id number]  Today's Date: 2/25/2024   Time Calculation  Start Time: 1001  Stop Time: 1029  Time Calculation (min): 28 min    Assessment/Plan   PT Assessment  PT Assessment Results: Decreased endurance, Impaired balance, Decreased mobility, Decreased safety awareness  Rehab Prognosis: Good  Evaluation/Treatment Tolerance: Patient limited by fatigue  End of Session Communication: Bedside nurse  Assessment Comment: 53 year old male presents with mild decline from baseline functional mobility, decreased balance, and decreased tolerance to activity.  End of Session Patient Position: Bed, 4 rail up, Alarm on (sitter present)   IP OR SWING BED PT PLAN  Inpatient or Swing Bed: Inpatient  PT Plan  Treatment/Interventions: Transfer training, Gait training, Stair training, Balance training, Endurance training  PT Plan: Skilled PT  PT Frequency: 2 times per week  PT Discharge Recommendations: Low intensity level of continued care  PT Recommended Transfer Status: Stand by assist  PT - OK to Discharge: Yes      Subjective     General Visit Information:  General  Reason for Referral: Impaired mobility s/p unwitnessed fall.  Past Medical History Relevant to Rehab: Schizophrenia, ETOH abuse, multiple abdominal / hernia surgeries, HTN, left fibular fracture, colon surgery,  Prior to Session Communication: Bedside nurse  Patient Position Received: Bed, 4 rail up  General Comment: 53 year old male admit from [facility] with unwitnessed fall;  patient with recent abdominal surgery 1/17/2024 at [facility] with discharge to LTAC then transfer to [facility].  Home Living:  Home Living  Type of Home: House  Lives With: Alone  Home Access: Stairs to enter with rails  Entrance Stairs-Number of Steps: 4 + 8  Home Living Comments: Limited questions by this evaluator in attempt to maintain low stim environment.  Prior Level of  Function:  Prior Function Per Pt/Caregiver Report  Ambulatory Assistance: Independent  Precautions:  Precautions  Medical Precautions: Fall precautions, Infection precautions (+flu)  Precautions Comment: Sitter         Objective   Pain:  Pain Assessment  Pain Assessment: FLACC (Face, Legs, Activity, Cry, Consolability)  Pain Score: 3  Pain Type: Acute pain  Pain Location:  (pb LE)  Cognition:  Cognition  Overall Cognitive Status: Within Functional Limits (cooperative this date; was in 4 point restraints 02/24 due to combative and aggressive behavior.)    General Assessments:                  Activity Tolerance  Endurance: Decreased tolerance for upright activites  Activity Tolerance Comments: Fatigue    Sensation  Light Touch:  (not evaluated)    Strength  Strength Comments: Assessed via function  Coordination  Movements are Fluid and Coordinated: No  Lower Body Coordination: slower rate of movement pb LE    Static Sitting Balance  Static Sitting-Balance Support: No upper extremity supported  Static Sitting-Level of Assistance: Independent    Static Standing Balance  Static Standing-Balance Support: No upper extremity supported  Static Standing-Level of Assistance: Close supervision  Static Standing-Comment/Number of Minutes: Supervision for balance  Functional Assessments:  Bed Mobility  Bed Mobility: Yes  Bed Mobility 1  Bed Mobility 1: Supine to sitting  Level of Assistance 1: Independent  Bed Mobility 2  Bed Mobility  2: Sitting to supine  Level of Assistance 2: Independent    Transfers  Transfer: Yes  Transfer 1  Transfer From 1: Sit to  Transfer to 1: Stand  Technique 1: Sit to stand  Transfer Level of Assistance 1: Close supervision  Trials/Comments 1: Supervision for balance  Transfers 2  Transfer From 2: Stand to  Transfer to 2: Sit  Technique 2: Stand to sit  Transfer Level of Assistance 2: Close supervision  Trials/Comments 2: Supervision for balance    Ambulation/Gait Training  Ambulation/Gait Training  Performed: Yes  Ambulation/Gait Training 1  Surface 1: Level tile  Device 1: No device  Assistance 1: Close supervision  Comments/Distance (ft) 1: 10 feet x 1;  30 feet x1; 40 feet x 1 with supervision for balance (Patient ambulates with slow nigel, decreased step length pb LE, and decreased arm swing pb UE.)       Extremity/Trunk Assessments:  RLE   RLE : Within Functional Limits  LLE   LLE : Within Functional Limits  Treatments:            Bed Mobility  Bed Mobility: Yes  Bed Mobility 1  Bed Mobility 1: Supine to sitting  Level of Assistance 1: Independent  Bed Mobility 2  Bed Mobility  2: Sitting to supine  Level of Assistance 2: Independent    Ambulation/Gait Training  Ambulation/Gait Training Performed: Yes  Ambulation/Gait Training 1  Surface 1: Level tile  Device 1: No device  Assistance 1: Close supervision  Comments/Distance (ft) 1: 10 feet x 1;  30 feet x1; 40 feet x 1 with supervision for balance (Patient ambulates with slow nigel, decreased step length pb LE, and decreased arm swing pb UE.)  Transfers  Transfer: Yes  Transfer 1  Transfer From 1: Sit to  Transfer to 1: Stand  Technique 1: Sit to stand  Transfer Level of Assistance 1: Close supervision  Trials/Comments 1: Supervision for balance  Transfers 2  Transfer From 2: Stand to  Transfer to 2: Sit  Technique 2: Stand to sit  Transfer Level of Assistance 2: Close supervision  Trials/Comments 2: Supervision for balance    Stairs  Stairs: No       Outcome Measures:  [facility] Basic Mobility  Turning from your back to your side while in a flat bed without using bedrails: None  Moving from lying on your back to sitting on the side of a flat bed without using bedrails: None  Moving to and from bed to chair (including a wheelchair): A little  Standing up from a chair using your arms (e.g. wheelchair or bedside chair): A little  To walk in hospital room: A little  Climbing 3-5 steps with railing: A little  Basic Mobility - Total Score: 20    Encounter  Problems       Encounter Problems (Active)       Mobility       Transfers including sit to stand and stand to sit independently. (Progressing)       Start:  02/25/24    Expected End:  03/10/24            Ambulate 150 feet independently without assistive device. (Progressing)       Start:  02/25/24    Expected End:  03/10/24                   Education Documentation  Mobility Training, taught by Manuel Jon PT at 2/25/2024 10:43 AM.  Learner: Patient  Readiness: Acceptance  Method: Demonstration, Explanation  Response: Needs Reinforcement    Education Comments  No comments found.

## 2024-02-25 NOTE — CARE PLAN
Problem: Mobility  Goal: Transfers including sit to stand and stand to sit independently.  Outcome: Progressing  Goal: Ambulate 150 feet independently without assistive device.  Outcome: Progressing
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
## 2024-02-26 ENCOUNTER — APPOINTMENT (OUTPATIENT)
Dept: CARDIOLOGY | Facility: HOSPITAL | Age: 54
DRG: 312 | End: 2024-02-26
Payer: MEDICARE

## 2024-02-26 LAB
AORTIC VALVE PEAK VELOCITY: 1.61 M/S
AV PEAK GRADIENT: 10.4 MMHG
AVA (PEAK VEL): 2.5 CM2
EJECTION FRACTION APICAL 4 CHAMBER: 65.8
EJECTION FRACTION: 60 %
LEFT VENTRICULAR OUTFLOW TRACT DIAMETER: 2 CM
MITRAL VALVE E/A RATIO: 0.77
MITRAL VALVE E/E' RATIO: 7.04

## 2024-02-26 PROCEDURE — 99232 SBSQ HOSP IP/OBS MODERATE 35: CPT

## 2024-02-26 PROCEDURE — 2500000001 HC RX 250 WO HCPCS SELF ADMINISTERED DRUGS (ALT 637 FOR MEDICARE OP)

## 2024-02-26 PROCEDURE — 2500000005 HC RX 250 GENERAL PHARMACY W/O HCPCS

## 2024-02-26 PROCEDURE — 2500000002 HC RX 250 W HCPCS SELF ADMINISTERED DRUGS (ALT 637 FOR MEDICARE OP, ALT 636 FOR OP/ED): Mod: MUE

## 2024-02-26 PROCEDURE — 93880 EXTRACRANIAL BILAT STUDY: CPT | Performed by: INTERNAL MEDICINE

## 2024-02-26 PROCEDURE — 1210000001 HC SEMI-PRIVATE ROOM DAILY

## 2024-02-26 PROCEDURE — 2500000004 HC RX 250 GENERAL PHARMACY W/ HCPCS (ALT 636 FOR OP/ED)

## 2024-02-26 PROCEDURE — 93880 EXTRACRANIAL BILAT STUDY: CPT

## 2024-02-26 RX ADMIN — IBUPROFEN 400 MG: 400 TABLET, FILM COATED ORAL at 01:20

## 2024-02-26 RX ADMIN — ACETAMINOPHEN 975 MG: 325 TABLET ORAL at 00:09

## 2024-02-26 RX ADMIN — OSELTAMIVIR PHOSPHATE 75 MG: 75 CAPSULE ORAL at 22:01

## 2024-02-26 RX ADMIN — DIPHENHYDRAMINE HYDROCHLORIDE 25 MG: 50 INJECTION, SOLUTION INTRAMUSCULAR; INTRAVENOUS at 00:09

## 2024-02-26 RX ADMIN — HALOPERIDOL LACTATE 10 MG: 5 INJECTION, SOLUTION INTRAMUSCULAR at 22:01

## 2024-02-26 RX ADMIN — DEXTROSE MONOHYDRATE 500 MG: 50 INJECTION, SOLUTION INTRAVENOUS at 13:57

## 2024-02-26 RX ADMIN — METOPROLOL TARTRATE 50 MG: 50 TABLET ORAL at 22:01

## 2024-02-26 RX ADMIN — HALOPERIDOL LACTATE 10 MG: 5 INJECTION, SOLUTION INTRAMUSCULAR at 09:34

## 2024-02-26 RX ADMIN — Medication 6 MG: at 22:01

## 2024-02-26 RX ADMIN — IBUPROFEN 400 MG: 400 TABLET, FILM COATED ORAL at 11:13

## 2024-02-26 RX ADMIN — DEXTROSE MONOHYDRATE 500 MG: 50 INJECTION, SOLUTION INTRAVENOUS at 01:29

## 2024-02-26 ASSESSMENT — COGNITIVE AND FUNCTIONAL STATUS - GENERAL
MOBILITY SCORE: 24
DAILY ACTIVITIY SCORE: 24

## 2024-02-26 ASSESSMENT — PAIN SCALES - GENERAL
PAINLEVEL_OUTOF10: 0 - NO PAIN
PAINLEVEL_OUTOF10: 5 - MODERATE PAIN
PAINLEVEL_OUTOF10: 0 - NO PAIN

## 2024-02-26 ASSESSMENT — PAIN - FUNCTIONAL ASSESSMENT
PAIN_FUNCTIONAL_ASSESSMENT: 0-10
PAIN_FUNCTIONAL_ASSESSMENT: WONG-BAKER FACES
PAIN_FUNCTIONAL_ASSESSMENT: 0-10

## 2024-02-26 ASSESSMENT — PAIN SCALES - WONG BAKER: WONGBAKER_NUMERICALRESPONSE: NO HURT

## 2024-02-26 NOTE — CONSULTS
"Nutrition Assessement Note    Nutrition Assessment    Reason for Assessment: Admission nursing screening (MST 3)    Reason for Hospital Admission:  Delta Phoenix is a 53 y.o. male who is admitted for fall mental status. history of schizophrenia, alcohol use disorder brought in from [facility]. Pt in influenza isolation, will continue to follow up for PO intakes.     Nutrition History:  Food and Nutrient History: Per chart, appetite is good  Energy Intake: Good > 75 %  Food Allergies/Intolerances:  None    Anthropometrics:  Ht: 182.9 cm (6' 0.01\"), Wt: 74 kg (163 lb 2.3 oz), BMI: 22.12  IBW/kg (Dietitian Calculated): 79.55 kg          Weight Change:  Daily Weight  02/24/24 : 74 kg (163 lb 2.3 oz)  01/31/22 : 86.6 kg (191 lb)     Weight History / % Weight Change: Per chart 28# (15%) over 2 years  Significant Weight Loss: No          Nutrition Focused Physical Exam Findings: defer: Pt in isolation                      Nutrition Significant Labs:  Lab Results   Component Value Date    WBC 4.6 02/24/2024    HGB 11.3 (L) 02/24/2024    HCT 35.4 (L) 02/24/2024     (L) 02/24/2024    CHOL 149 02/26/2021    TRIG 89 02/26/2021    HDL 42 02/26/2021    ALT 21 02/24/2024    AST 35 02/24/2024     (L) 02/25/2024    K 4.7 02/25/2024    CL 99 02/25/2024    CREATININE 1.00 02/25/2024    BUN 20 02/25/2024    CO2 22 (L) 02/25/2024    TSH 2.53 07/22/2023    INR 1.3 (H) 02/23/2024    HGBA1C 5.7 (H) 02/08/2024       Current Facility-Administered Medications:     acetaminophen (Tylenol) tablet 975 mg, 975 mg, oral, q6h PRN, CARMEN Delaney, 975 mg at 02/26/24 0009    amLODIPine (Norvasc) tablet 10 mg, 10 mg, oral, Daily, CARMEN Delaney    diphenhydrAMINE (BENADryl) injection 25 mg, 25 mg, intravenous, q6h PRN, CARMEN Delaney, 25 mg at 02/26/24 0009    haloperidol lactate (Haldol) injection 10 mg, 10 mg, intramuscular, BID, CARMEN Delaney, 10 mg at 02/26/24 0934    " ibuprofen tablet 400 mg, 400 mg, oral, q6h PRN, CARMEN Delaney, 400 mg at 02/26/24 0120    labetaloL (Normodyne,Trandate) injection 10 mg, 10 mg, intravenous, q4h PRN, CARMEN Delaney    melatonin tablet 6 mg, 6 mg, oral, Nightly, CARMEN Delaney, 6 mg at 02/25/24 2021    metoprolol tartrate (Lopressor) tablet 50 mg, 50 mg, oral, BID, CARMEN Delaney, 50 mg at 02/25/24 2021    [MAR Hold] nicotine polacrilex (Nicorette) gum 4 mg, 4 mg, Mouth/Throat, q2h PRN, Kelvin Hernandez MD, 4 mg at 02/25/24 1558    oseltamivir (Tamiflu) capsule 75 mg, 75 mg, oral, BID, CARMEN Delaney, 75 mg at 02/25/24 2021    perflutren lipid microspheres (Definity) injection 0.5-10 mL of dilution, 0.5-10 mL of dilution, intravenous, Once in imaging, CARMEN Delaney    perflutren protein A microsphere (Optison) injection 0.5 mL, 0.5 mL, intravenous, Once in imaging, CARMEN Delaney    risperiDONE (RisperDAL) tablet 1 mg, 1 mg, oral, BID, CARMEN Delaney, 1 mg at 02/25/24 2022    sulfur hexafluoride microsphr (Lumason) injection 24.28 mg, 2 mL, intravenous, Once in imaging, CARMEN Delaney    valproate (Depacon) 500 mg in dextrose 5 % in water (D5W) 50 mL IV, 500 mg, intravenous, q6h, CARMEN Delaney, Stopped at 02/26/24 0229    Dietary Orders (From admission, onward)       Start     Ordered    02/24/24 1515  Adult diet Regular; Safety tray  Diet effective now        Question Answer Comment   Diet type Regular    Select tray type: Safety tray        02/24/24 1514                  Estimated Needs:   Estimated Energy Needs  Total Energy Estimated Needs (kCal):  (8746-2891)  Total Estimated Energy Need per Day (kCal/kg):  (25-30)  Method for Estimating Needs: actual wt    Estimated Protein Needs  Total Protein Estimated Needs (g):  (59-74)  Total Protein Estimated Needs (g/kg):  (0.8-1)  Method for Estimating Needs: actual wt    Estimated Fluid  Needs  Total Fluid Estimated Needs (mL):  (3480-0521)        Nutrition Diagnosis   Nutrition Diagnosis:       Nutrition Diagnosis  Patient has Nutrition Diagnosis: No       Nutrition Interventions/Recommendations   Nutrition Interventions and Recommendations:    Nutrition Prescription:  Individualized Nutrition Prescription Provided for : 4092-7871 kcals, 59-74 gm protein via diet    Nutrition Interventions:   Food and/or Nutrient Delivery Interventions  Interventions: Meals and snacks  Meals and Snacks: General healthful diet  Goal: provide diet as ordered    Education Documentation  No documentation found.           Nutrition Monitoring and Evaluation   Monitoring/Evaluation:   Food/Nutrient Related History Monitoring  Monitoring and Evaluation Plan: Energy intake  Energy Intake: Estimated energy intake  Criteria: Pt to consume >/= 75% of estimated needs         Time Spent/Follow-up:   Follow Up  Time Spent (min): 20 minutes  Last Date of Nutrition Visit: 02/26/24  Nutrition Follow-Up Needed?: 7-10 days  Follow up Comment: 3/5/24

## 2024-02-26 NOTE — PROGRESS NOTES
Delta Phoenix is a 53 y.o. male on day 3 of admission presenting with Altered mental status.    Subjective   Patient seen and examined.  Resting in bed in no acute distress.  Sitter, mother bedside.  Awake alert oriented x 3.  No complaints.  Slept better last night.  No depression.  Anxious to leave the hospital - requests to smoke.  No hallucinations SI/HI.      Per nursing, patient refused AM p.o and IV medications.  Using profanity.     Objective   Droplet, contact plus contact precautions in place  Sitter and mother present bedside    Physical Exam  Vitals reviewed.   Constitutional:       General: He is not in acute distress.     Appearance: Normal appearance. He is normal weight. He is not ill-appearing or toxic-appearing.   HENT:      Head: Normocephalic and atraumatic.      Right Ear: Tympanic membrane normal.      Left Ear: Tympanic membrane normal.      Nose: Nose normal.      Mouth/Throat:      Mouth: Mucous membranes are moist.      Pharynx: Oropharynx is clear.   Eyes:      Extraocular Movements: Extraocular movements intact.      Conjunctiva/sclera: Conjunctivae normal.      Pupils: Pupils are equal, round, and reactive to light.   Cardiovascular:      Rate and Rhythm: Normal rate and regular rhythm.      Pulses: Normal pulses.      Heart sounds: Normal heart sounds.   Pulmonary:      Effort: Pulmonary effort is normal. No respiratory distress.      Breath sounds: Normal breath sounds. No wheezing, rhonchi or rales.      Comments: Room air.  Abdominal:      General: Bowel sounds are normal. There is no distension.      Palpations: Abdomen is soft.      Tenderness: There is no abdominal tenderness.   Genitourinary:     Comments: Deferred.  Musculoskeletal:         General: No swelling or tenderness. Normal range of motion.      Cervical back: Normal range of motion and neck supple.   Skin:     General: Skin is warm and dry.      Capillary Refill: Capillary refill takes less than 2 seconds.       "Comments: Right eyebrow laceration sutures in place.  Abdominal scar healed.   Neurological:      General: No focal deficit present.      Mental Status: He is alert and oriented to person, place, and time.      Comments: Confused.   Psychiatric:         Mood and Affect: Mood is anxious. Affect is flat.         Behavior: Behavior is cooperative.       Last Recorded Vitals  Blood pressure 100/58, pulse 54, temperature 36.6 °C (97.9 °F), temperature source Oral, resp. rate 18, height 1.829 m (6' 0.01\"), weight 74 kg (163 lb 2.3 oz), SpO2 92 %.    Intake/Output last 3 Shifts:  I/O last 3 completed shifts:  In: 3704 (50.1 mL/kg) [P.O.:3404; IV Piggyback:300]  Out: 600 (8.1 mL/kg) [Urine:600 (0.2 mL/kg/hr)]  Weight: 74 kg     Relevant Results  No results found for this or any previous visit (from the past 24 hour(s)).    Results for orders placed or performed during the hospital encounter of 02/23/24 (from the past 96 hour(s))   POCT GLUCOSE   Result Value Ref Range    POCT Glucose 76 74 - 99 mg/dL   CBC and Auto Differential   Result Value Ref Range    WBC 5.0 4.4 - 11.3 x10*3/uL    nRBC 0.0 0.0 - 0.0 /100 WBCs    RBC 3.69 (L) 4.50 - 5.90 x10*6/uL    Hemoglobin 11.2 (L) 13.5 - 17.5 g/dL    Hematocrit 35.7 (L) 41.0 - 52.0 %    MCV 97 80 - 100 fL    MCH 30.4 26.0 - 34.0 pg    MCHC 31.4 (L) 32.0 - 36.0 g/dL    RDW 16.8 (H) 11.5 - 14.5 %    Platelets 144 (L) 150 - 450 x10*3/uL    Neutrophils % 49.7 40.0 - 80.0 %    Immature Granulocytes %, Automated 0.4 0.0 - 0.9 %    Lymphocytes % 30.9 13.0 - 44.0 %    Monocytes % 15.0 2.0 - 10.0 %    Eosinophils % 3.6 0.0 - 6.0 %    Basophils % 0.4 0.0 - 2.0 %    Neutrophils Absolute 2.48 1.20 - 7.70 x10*3/uL    Immature Granulocytes Absolute, Automated 0.02 0.00 - 0.70 x10*3/uL    Lymphocytes Absolute 1.54 1.20 - 4.80 x10*3/uL    Monocytes Absolute 0.75 0.10 - 1.00 x10*3/uL    Eosinophils Absolute 0.18 0.00 - 0.70 x10*3/uL    Basophils Absolute 0.02 0.00 - 0.10 x10*3/uL   Comprehensive " metabolic panel   Result Value Ref Range    Glucose 95 65 - 99 mg/dL    Sodium 143 133 - 145 mmol/L    Potassium 4.2 3.4 - 5.1 mmol/L    Chloride 108 (H) 97 - 107 mmol/L    Bicarbonate 29 24 - 31 mmol/L    Urea Nitrogen 9 8 - 25 mg/dL    Creatinine 0.60 0.40 - 1.60 mg/dL    eGFR >90 >60 mL/min/1.73m*2    Calcium 9.7 8.5 - 10.4 mg/dL    Albumin 3.2 (L) 3.5 - 5.0 g/dL    Alkaline Phosphatase 104 35 - 125 U/L    Total Protein 6.4 5.9 - 7.9 g/dL    AST 23 5 - 40 U/L    Bilirubin, Total 0.2 0.1 - 1.2 mg/dL    ALT 14 5 - 40 U/L    Anion Gap 6 <=19 mmol/L   Magnesium   Result Value Ref Range    Magnesium 2.00 1.60 - 3.10 mg/dL   BLOOD GAS LACTIC ACID, VENOUS   Result Value Ref Range    POCT Lactate, Venous 1.4 0.4 - 2.0 mmol/L   Valproic acid level, total   Result Value Ref Range    Valproic Acid 32 (L) 50 - 100 ug/mL   Troponin T   Result Value Ref Range    Troponin T, High Sensitivity 29 (HH) <=14 ng/L   ECG 12 lead   Result Value Ref Range    Ventricular Rate 59 BPM    Atrial Rate 59 BPM    CO Interval 132 ms    QRS Duration 82 ms    QT Interval 404 ms    QTC Calculation(Bazett) 399 ms    P Axis 51 degrees    R Axis 60 degrees    T Axis 17 degrees    QRS Count 10 beats    Q Onset 221 ms    P Onset 155 ms    P Offset 207 ms    T Offset 423 ms    QTC Fredericia 401 ms   Urinalysis with Reflex Culture and Microscopic   Result Value Ref Range    Color, Urine Colorless (N) Light-Yellow, Yellow, Dark-Yellow    Appearance, Urine Clear Clear    Specific Gravity, Urine 1.007 1.005 - 1.035    pH, Urine 7.5 5.0, 5.5, 6.0, 6.5, 7.0, 7.5, 8.0    Protein, Urine NEGATIVE NEGATIVE, 10 (TRACE), 20 (TRACE) mg/dL    Glucose, Urine Normal Normal mg/dL    Blood, Urine NEGATIVE NEGATIVE    Ketones, Urine NEGATIVE NEGATIVE mg/dL    Bilirubin, Urine NEGATIVE NEGATIVE    Urobilinogen, Urine Normal Normal mg/dL    Nitrite, Urine NEGATIVE NEGATIVE    Leukocyte Esterase, Urine NEGATIVE NEGATIVE   Drug Screen, Urine   Result Value Ref Range     Amphetamine Screen, Urine Negative      Barbiturate Screen, Urine Negative      Benzodiazepines Screen, Urine Negative      Cannabinoid Screen, Urine Negative      Cocaine Metabolite Screen, Urine Negative      Fentanyl Screen, Urine Negative       Methadone Screen, Urine Negative      Opiate Screen, Urine Negative      Oxycodone Screen, Urine Negative      PCP Screen, Urine Negative     Ammonia   Result Value Ref Range    Ammonia 25 12 - 45 umol/L   aPTT   Result Value Ref Range    aPTT 27.5 22.0 - 32.5 seconds   Protime-INR   Result Value Ref Range    Protime 13.2 (H) 9.3 - 12.7 seconds    INR 1.3 (H) 0.9 - 1.2   POCT GLUCOSE   Result Value Ref Range    POCT Glucose 94 74 - 99 mg/dL   Transthoracic Echo (TTE) Complete   Result Value Ref Range    AV pk zion 1.61 m/s    LVOT diam 2.00 cm    MV E/A ratio 0.77     LV biplane EF 60 %    MV avg E/e' ratio 7.04     AV pk grad 10.4 mmHg    Aortic Valve Area by Continuity of Peak Velocity 2.50 cm2    LV A4C EF 65.8    Comprehensive metabolic panel   Result Value Ref Range    Glucose 115 (H) 65 - 99 mg/dL    Sodium 136 133 - 145 mmol/L    Potassium 4.4 3.4 - 5.1 mmol/L    Chloride 103 97 - 107 mmol/L    Bicarbonate 22 (L) 24 - 31 mmol/L    Urea Nitrogen 10 8 - 25 mg/dL    Creatinine 0.80 0.40 - 1.60 mg/dL    eGFR >90 >60 mL/min/1.73m*2    Calcium 9.7 8.5 - 10.4 mg/dL    Albumin 3.1 (L) 3.5 - 5.0 g/dL    Alkaline Phosphatase 104 35 - 125 U/L    Total Protein 6.4 5.9 - 7.9 g/dL    AST 35 5 - 40 U/L    Bilirubin, Total 0.3 0.1 - 1.2 mg/dL    ALT 21 5 - 40 U/L    Anion Gap 11 <=19 mmol/L   CBC   Result Value Ref Range    WBC 4.6 4.4 - 11.3 x10*3/uL    nRBC 0.0 0.0 - 0.0 /100 WBCs    RBC 3.77 (L) 4.50 - 5.90 x10*6/uL    Hemoglobin 11.3 (L) 13.5 - 17.5 g/dL    Hematocrit 35.4 (L) 41.0 - 52.0 %    MCV 94 80 - 100 fL    MCH 30.0 26.0 - 34.0 pg    MCHC 31.9 (L) 32.0 - 36.0 g/dL    RDW 17.2 (H) 11.5 - 14.5 %    Platelets 147 (L) 150 - 450 x10*3/uL   Sars-CoV-2 and Influenza A/B PCR    Result Value Ref Range    Flu A Result Detected (A) Not Detected    Flu B Result Not Detected Not Detected    Coronavirus 2019, PCR Not Detected Not Detected   Basic metabolic panel   Result Value Ref Range    Glucose 106 (H) 65 - 99 mg/dL    Sodium 132 (L) 133 - 145 mmol/L    Potassium 4.7 3.4 - 5.1 mmol/L    Chloride 99 97 - 107 mmol/L    Bicarbonate 22 (L) 24 - 31 mmol/L    Urea Nitrogen 20 8 - 25 mg/dL    Creatinine 1.00 0.40 - 1.60 mg/dL    eGFR 89 >60 mL/min/1.73m*2    Calcium 8.9 8.5 - 10.4 mg/dL    Anion Gap 11 <=19 mmol/L   Magnesium   Result Value Ref Range    Magnesium 2.00 1.60 - 3.10 mg/dL   Phosphorus   Result Value Ref Range    Phosphorus 4.1 2.5 - 4.5 mg/dL   Lavender Top   Result Value Ref Range    Extra Tube Hold for add-ons.      No results found for the last 90 days.    No results found.    Scheduled medications  amLODIPine, 10 mg, oral, Daily  haloperidol lactate, 10 mg, intramuscular, BID  melatonin, 6 mg, oral, Nightly  metoprolol tartrate, 50 mg, oral, BID  oseltamivir, 75 mg, oral, BID  perflutren lipid microspheres, 0.5-10 mL of dilution, intravenous, Once in imaging  perflutren protein A microsphere, 0.5 mL, intravenous, Once in imaging  risperiDONE, 1 mg, oral, BID  sulfur hexafluoride microsphr, 2 mL, intravenous, Once in imaging  valproate sodium, 500 mg, intravenous, q6h      Continuous medications     PRN medications  PRN medications: acetaminophen, diphenhydrAMINE, ibuprofen, labetaloL, [MAR Hold] nicotine polacrilex    ASSESSMENT:  Fall  Facial trauma - right eye brow laceration, repair  Altered mental status  Agitation/delirium  Schizophrenia  Alcohol dependence  Influenza A  Candida auris colonization positive screen  Abdominal hernia - repair - stable    PLAN:  Neurology, Psychiatry input appreciated.  Psychiatry follow-up.  Sitter.  Continue current medications - as agreeable.  Encourage compliance with medications and treatment.  Respiratory status, pulse oximetry stable on  room air.  Tamiflu.  Droplet isolation.  Tobacco cessation.  Nicorette gum.  Candida auris colonization positive screen 2/7/2024 at [facility].  No active infection.  Contact isolation per Hospital protocol.  PT/OT.  Fall precautions.  Up with assistance only.  Bed and chair alarm at all times.  DVT prophylaxis.  Supportive care.  Patient and mother bedside, reassured.  Case management following.  Psychiatry following.  Anticipate discharge per Psychiatry recommendations when arrangements are made.  Discussed with Dr. Hernandez.    Jennie Cannon, ANAMARIA-CNP

## 2024-02-26 NOTE — NURSING NOTE
Pt verbally aggressive. Was able to redirect enough to get a vital with no temp, nurse informed pt O2 low. Pt complained of being cold, so temp adjusted in room and pt given extra blankets. Pt offered snacks, but declined.

## 2024-02-26 NOTE — PROGRESS NOTES
Delta Phoenix is a 53 y.o. male on day 3 of admission presenting with Altered mental status.      Patient is in isolation for flu+ and candida auris.  Attempt to call patient in room unsuccessful.  Psych NP in to see today.  Plan is for patient to return to [facility] when medically cleared.                          Florence Nuñez RN

## 2024-02-27 PROCEDURE — 1210000001 HC SEMI-PRIVATE ROOM DAILY

## 2024-02-27 PROCEDURE — 2500000001 HC RX 250 WO HCPCS SELF ADMINISTERED DRUGS (ALT 637 FOR MEDICARE OP)

## 2024-02-27 PROCEDURE — 99232 SBSQ HOSP IP/OBS MODERATE 35: CPT

## 2024-02-27 PROCEDURE — 2500000002 HC RX 250 W HCPCS SELF ADMINISTERED DRUGS (ALT 637 FOR MEDICARE OP, ALT 636 FOR OP/ED)

## 2024-02-27 PROCEDURE — 2500000001 HC RX 250 WO HCPCS SELF ADMINISTERED DRUGS (ALT 637 FOR MEDICARE OP): Performed by: NURSE PRACTITIONER

## 2024-02-27 PROCEDURE — 2500000004 HC RX 250 GENERAL PHARMACY W/ HCPCS (ALT 636 FOR OP/ED)

## 2024-02-27 RX ORDER — MICONAZOLE NITRATE 2 %
2 CREAM (GRAM) TOPICAL EVERY 4 HOURS PRN
Status: DISCONTINUED | OUTPATIENT
Start: 2024-02-27 | End: 2024-02-28 | Stop reason: HOSPADM

## 2024-02-27 RX ADMIN — OSELTAMIVIR PHOSPHATE 75 MG: 75 CAPSULE ORAL at 20:07

## 2024-02-27 RX ADMIN — NICOTINE POLACRILEX 2 MG: 2 GUM, CHEWING BUCCAL at 20:09

## 2024-02-27 RX ADMIN — NICOTINE POLACRILEX 4 MG: 2 GUM, CHEWING BUCCAL at 11:35

## 2024-02-27 RX ADMIN — RISPERIDONE 1 MG: 1 TABLET, FILM COATED ORAL at 20:09

## 2024-02-27 RX ADMIN — METOPROLOL TARTRATE 50 MG: 50 TABLET ORAL at 20:20

## 2024-02-27 RX ADMIN — IBUPROFEN 400 MG: 400 TABLET, FILM COATED ORAL at 09:21

## 2024-02-27 RX ADMIN — AMLODIPINE BESYLATE 10 MG: 10 TABLET ORAL at 09:21

## 2024-02-27 RX ADMIN — HALOPERIDOL LACTATE 10 MG: 5 INJECTION, SOLUTION INTRAMUSCULAR at 09:21

## 2024-02-27 RX ADMIN — HALOPERIDOL LACTATE 10 MG: 5 INJECTION, SOLUTION INTRAMUSCULAR at 20:10

## 2024-02-27 RX ADMIN — METOPROLOL TARTRATE 50 MG: 50 TABLET ORAL at 09:21

## 2024-02-27 RX ADMIN — ACETAMINOPHEN 975 MG: 325 TABLET ORAL at 20:08

## 2024-02-27 RX ADMIN — RISPERIDONE 1 MG: 1 TABLET, FILM COATED ORAL at 09:21

## 2024-02-27 RX ADMIN — IBUPROFEN 400 MG: 400 TABLET, FILM COATED ORAL at 00:13

## 2024-02-27 RX ADMIN — NICOTINE POLACRILEX 4 MG: 2 GUM, CHEWING BUCCAL at 17:08

## 2024-02-27 RX ADMIN — OSELTAMIVIR PHOSPHATE 75 MG: 75 CAPSULE ORAL at 10:22

## 2024-02-27 ASSESSMENT — ENCOUNTER SYMPTOMS
ARTHRALGIAS: 1
WEAKNESS: 1
ACTIVITY CHANGE: 1
DYSPHORIC MOOD: 1
HYPERACTIVE: 1
CONFUSION: 0
SLEEP DISTURBANCE: 1
FATIGUE: 1
HALLUCINATIONS: 0
MYALGIAS: 1
NERVOUS/ANXIOUS: 1
DECREASED CONCENTRATION: 0
AGITATION: 0

## 2024-02-27 ASSESSMENT — COGNITIVE AND FUNCTIONAL STATUS - GENERAL
MOBILITY SCORE: 24
DAILY ACTIVITIY SCORE: 24

## 2024-02-27 ASSESSMENT — PAIN SCALES - GENERAL
PAINLEVEL_OUTOF10: 2
PAINLEVEL_OUTOF10: 0 - NO PAIN
PAINLEVEL_OUTOF10: 0 - NO PAIN
PAINLEVEL_OUTOF10: 5 - MODERATE PAIN
PAINLEVEL_OUTOF10: 5 - MODERATE PAIN
PAINLEVEL_OUTOF10: 0 - NO PAIN

## 2024-02-27 ASSESSMENT — PAIN - FUNCTIONAL ASSESSMENT
PAIN_FUNCTIONAL_ASSESSMENT: FLACC (FACE, LEGS, ACTIVITY, CRY, CONSOLABILITY)
PAIN_FUNCTIONAL_ASSESSMENT: 0-10
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
## 2024-02-27 NOTE — CARE PLAN
The patient's goals for the shift include na    The clinical goals for the shift include Fall precaution, re-orientation to place and situation.    Over the shift, the patient did not make progress toward the following goals. Barriers to progression include . Recommendations to address these barriers include .      Problem: Pain  Goal: My pain/discomfort is manageable  Outcome: Progressing     Problem: Safety  Goal: Patient will be injury free during hospitalization  Outcome: Progressing  Goal: I will remain free of falls  Outcome: Progressing     Problem: Daily Care  Goal: Daily care needs are met  Outcome: Progressing     Problem: Psychosocial Needs  Goal: Demonstrates ability to cope with hospitalization/illness  Outcome: Progressing  Goal: Collaborate with me, my family, and caregiver to identify my specific goals  Outcome: Progressing     Problem: Discharge Barriers  Goal: My discharge needs are met  Outcome: Progressing     Problem: Skin  Goal: Decreased wound size/increased tissue granulation at next dressing change  Outcome: Progressing  Goal: Participates in plan/prevention/treatment measures  Outcome: Progressing  Goal: Prevent/manage excess moisture  Outcome: Progressing  Goal: Prevent/minimize sheer/friction injuries  Outcome: Progressing  Goal: Promote/optimize nutrition  Outcome: Progressing  Goal: Promote skin healing  Outcome: Progressing     Problem: Fall/Injury  Goal: Not fall by end of shift  Outcome: Progressing  Goal: Be free from injury by end of the shift  Outcome: Progressing  Goal: Verbalize understanding of personal risk factors for fall in the hospital  Outcome: Progressing  Goal: Verbalize understanding of risk factor reduction measures to prevent injury from fall in the home  Outcome: Progressing  Goal: Use assistive devices by end of the shift  Outcome: Progressing  Goal: Pace activities to prevent fatigue by end of the shift  Outcome: Progressing     Order review at end of shift  completed. Patient on bed awake, no distress noted.  Expresses no other needs at the present.  Safety measures in place, call light within reach.  Plan of care ongoing.
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
## 2024-02-27 NOTE — SIGNIFICANT EVENT
Application for Emergency Admission      Ready for Transfer?  Is the patient medically cleared for transfer to inpatient psychiatry: Yes  Has the patient been accepted to an inpatient psychiatric hospital: Yes    Application for Emergency Admission  IN ACCORDANCE WITH [place]  The Chief Clinical Officer of:  Dharmesh  2/28/2024 .12:03 PM    Reason for Hospitalization  The undersigned has reason to believe that: Delta Phoenix Is a mentally ill person subject to hospitalization by court order under division B Section 5122.01 of the Revised Code, i.e., this person:    1.No  Represents a substantial risk of physical harm to self as manifested by evidence of threats of, or attempts at, suicide or serious self-inflicted bodily harm    2.No Represents a substantial risk of physical harm to others as manifested by evidence of recent homicidal or other violent behavior, evidence of recent threats that place another in reasonable fear of violent behavior and serious physical harm, or other evidence of present dangerousness    3.Yes Represents a substantial and immediate risk of serious physical impairment or injury to self as manifested by  evidence that the person is unable to provide for and is not providing for the person's basic physical needs because of the person's mental illness and that appropriate provision for those needs cannot be made  immediately available in the community    4.Yes Would benefit from treatment in a hospital for his mental illness and is in need of such treatment as manifested by evidence of behavior that creates a grave and imminent risk to substantial rights of others or  himself.    5.Yes Would benefit from treatment as manifested by evidence of behavior that indicates all of the following:       (a) The person is unlikely to survive safely in the community without supervision, based on a clinical determination.       (b) The person has a history of lack of  compliance with treatment for mental illness and one of the following applies:      (i) At least twice within the thirty-six months prior to the filing of an affidavit seeking court-ordered treatment of the person under section 5122.111 of the Revised Code, the lack of compliance has been a significant factor in necessitating hospitalization in a hospital or receipt of services in a forensic or other mental health unit of a correctional facility, provided that the thirty-six-month period shall be extended by the length of any hospitalization or incarceration of the person that occurred within the thirty-six-month period.      (ii) Within the forty-eight months prior to the filing of an affidavit seeking court-ordered treatment of the person under section 5122.111 of the Revised Code, the lack of compliance resulted in one or more acts of serious violent behavior toward self or others or threats of, or attempts at, serious physical harm to self or others, provided that the forty-eight-month period shall be extended by the length of any hospitalization or incarceration of the person that occurred within the forty-eight-month period.      (c) The person, as a result of mental illness, is unlikely to voluntarily participate in necessary treatment.       (d) In view of the person's treatment history and current behavior, the person is in need of treatment in order to prevent a relapse or deterioration that would be likely to result in substantial risk of serious harm to the person or others.    (e) Represents a substantial risk of physical harm to self or others if allowed to remain at liberty pending examination.    Therefore, it is requested that said person be admitted to the above named facility.    STATEMENT OF BELIEF    Must be filled out by one of the following: a psychiatrist, licensed physician, licensed clinical psychologist, health or ,  or .  (Statement shall include the  circumstances under which the individual was taken into custody and the reason for the person's belief that hospitalization is necessary. The statement shall also include a reference to efforts made to secure the individual's property at his residence if he was taken into custody there. Every reasonable and appropriate effort should be made to take this person into custody in the least conspicuous manner possible.)    The patient is a 53 y.o. male with a past psychiatric history of schizophrenia.  He has had a long complicated recovery after undergoing hernia repair in January (1/16/24) at [facility].  Patient experienced hyperactive delirium and was subsequently transferred to [facility] on 2/20 for schizophrenia with continued hyperactive delirium, combativeness and agitation.   From there he presented to the [facility] emergency department for evaluation of fall, head injury on heparin and altered mental status. Pt became agitated in the afternoon of the day of his arrival requiring multiple doses of antipsychotic medications and 4 point restraints.   He is intermittently calm and cooperative, labile becomes ramped up rather quickly.  The patient is unable to contract to safety at this time. The patient would benefit from inpatient placement for his safety and safety of others and to reestablish medication management.    CARMEN Allison 2/28/2024     _____________________________________________________________   Place of Employment: [facility]    STATEMENT OF OBSERVATION BY PSYCHIATRIST, LICENSED PHYSICIAN, OR LICENSED CLINICAL PSYCHOLOGIST, IF APPLICABLE    Place of Observation (e.g., Counts include 234 beds at the [facility] mental health center, general hospital, office, emergency facility)  (If applicable, please complete)    CARMEN Allison 2/28/2024    _____________________________________________________________

## 2024-02-27 NOTE — NURSING NOTE
Patient on bed asleep, breathing even and unlabored, no distress noted.  Sitter in the room.  Droplet and contact precaution observed. Safety measures ensured and call light in reach.  Plan of care ongoing.    2201H:  Noted patient being verbally abusive during medication administration but agreed to take due medications at this time.

## 2024-02-27 NOTE — PROGRESS NOTES
Delta Phoenix is a 53 y.o. male on day 4 of admission presenting with Altered mental status.    Spoke with nursing, Nicotine gum requested.  Chart reviewed.  Medications administered.    Subjective   Patient seen and examined.  Ambulating in room then sitting up at the side of the bed in no acute distress.  Awake alert oriented x 3.  Forgetful.  Feels better.  Requests to smoke.  No complaints.  No shortness of breath, cough.  Slept last night.  Appetite good.    Objective   Droplet, contact plus contact precautions in place  Sitter     Physical Exam  Vitals reviewed.   Constitutional:       General: He is not in acute distress.     Appearance: Normal appearance. He is normal weight. He is not ill-appearing or toxic-appearing.   HENT:      Head: Normocephalic and atraumatic.      Right Ear: Tympanic membrane normal.      Left Ear: Tympanic membrane normal.      Nose: Nose normal.      Mouth/Throat:      Mouth: Mucous membranes are moist.      Pharynx: Oropharynx is clear.   Eyes:      Extraocular Movements: Extraocular movements intact.      Conjunctiva/sclera: Conjunctivae normal.      Pupils: Pupils are equal, round, and reactive to light.   Cardiovascular:      Rate and Rhythm: Normal rate and regular rhythm.      Pulses: Normal pulses.      Heart sounds: Normal heart sounds.   Pulmonary:      Effort: Pulmonary effort is normal. No respiratory distress.      Breath sounds: Normal breath sounds. No wheezing, rhonchi or rales.      Comments: Room air.  Abdominal:      General: Bowel sounds are normal. There is no distension.      Palpations: Abdomen is soft.      Tenderness: There is no abdominal tenderness.   Genitourinary:     Comments: Deferred.  Musculoskeletal:         General: No swelling or tenderness. Normal range of motion.      Cervical back: Normal range of motion and neck supple.   Skin:     General: Skin is warm and dry.      Capillary Refill: Capillary refill takes less than 2 seconds.       "Comments: Right eyebrow laceration sutures in place.  Abdominal scar healed.   Neurological:      General: No focal deficit present.      Mental Status: He is alert and oriented to person, place, and time.      Comments: Confused.   Psychiatric:         Mood and Affect: Mood is anxious. Affect is flat.         Behavior: Behavior is hyperactive. Behavior is cooperative.       Last Recorded Vitals  Blood pressure 133/82, pulse 67, temperature 36.3 °C (97.3 °F), temperature source Oral, resp. rate 18, height 1.829 m (6' 0.01\"), weight 74 kg (163 lb 2.3 oz), SpO2 95 %.    Intake/Output last 3 Shifts:  I/O last 3 completed shifts:  In: 100 (1.4 mL/kg) [IV Piggyback:100]  Out: 600 (8.1 mL/kg) [Urine:600 (0.2 mL/kg/hr)]  Weight: 74 kg     Relevant Results  No results found for this or any previous visit (from the past 24 hour(s)).    Results for orders placed or performed during the hospital encounter of 02/23/24 (from the past 96 hour(s))   POCT GLUCOSE   Result Value Ref Range    POCT Glucose 94 74 - 99 mg/dL   Transthoracic Echo (TTE) Complete   Result Value Ref Range    AV pk zion 1.61 m/s    LVOT diam 2.00 cm    MV E/A ratio 0.77     LV biplane EF 60 %    MV avg E/e' ratio 7.04     AV pk grad 10.4 mmHg    Aortic Valve Area by Continuity of Peak Velocity 2.50 cm2    LV A4C EF 65.8    Comprehensive metabolic panel   Result Value Ref Range    Glucose 115 (H) 65 - 99 mg/dL    Sodium 136 133 - 145 mmol/L    Potassium 4.4 3.4 - 5.1 mmol/L    Chloride 103 97 - 107 mmol/L    Bicarbonate 22 (L) 24 - 31 mmol/L    Urea Nitrogen 10 8 - 25 mg/dL    Creatinine 0.80 0.40 - 1.60 mg/dL    eGFR >90 >60 mL/min/1.73m*2    Calcium 9.7 8.5 - 10.4 mg/dL    Albumin 3.1 (L) 3.5 - 5.0 g/dL    Alkaline Phosphatase 104 35 - 125 U/L    Total Protein 6.4 5.9 - 7.9 g/dL    AST 35 5 - 40 U/L    Bilirubin, Total 0.3 0.1 - 1.2 mg/dL    ALT 21 5 - 40 U/L    Anion Gap 11 <=19 mmol/L   CBC   Result Value Ref Range    WBC 4.6 4.4 - 11.3 x10*3/uL    nRBC " 0.0 0.0 - 0.0 /100 WBCs    RBC 3.77 (L) 4.50 - 5.90 x10*6/uL    Hemoglobin 11.3 (L) 13.5 - 17.5 g/dL    Hematocrit 35.4 (L) 41.0 - 52.0 %    MCV 94 80 - 100 fL    MCH 30.0 26.0 - 34.0 pg    MCHC 31.9 (L) 32.0 - 36.0 g/dL    RDW 17.2 (H) 11.5 - 14.5 %    Platelets 147 (L) 150 - 450 x10*3/uL   Sars-CoV-2 and Influenza A/B PCR   Result Value Ref Range    Flu A Result Detected (A) Not Detected    Flu B Result Not Detected Not Detected    Coronavirus 2019, PCR Not Detected Not Detected   Basic metabolic panel   Result Value Ref Range    Glucose 106 (H) 65 - 99 mg/dL    Sodium 132 (L) 133 - 145 mmol/L    Potassium 4.7 3.4 - 5.1 mmol/L    Chloride 99 97 - 107 mmol/L    Bicarbonate 22 (L) 24 - 31 mmol/L    Urea Nitrogen 20 8 - 25 mg/dL    Creatinine 1.00 0.40 - 1.60 mg/dL    eGFR 89 >60 mL/min/1.73m*2    Calcium 8.9 8.5 - 10.4 mg/dL    Anion Gap 11 <=19 mmol/L   Magnesium   Result Value Ref Range    Magnesium 2.00 1.60 - 3.10 mg/dL   Phosphorus   Result Value Ref Range    Phosphorus 4.1 2.5 - 4.5 mg/dL   Lavender Top   Result Value Ref Range    Extra Tube Hold for add-ons.      Carotid duplex bilateral    Result Date: 2/26/2024  Preliminary [place] [address]            Phone [phone]      Preliminary Vascular Lab Report  [facility] CAROTID ARTERY DUPLEX BILATERAL  Patient Name:     VAISHNAVI Argueta Physician:  [id number] Carmen Stephens MD Study Date:       2/26/2024         Ordering Provider:  [id number] IGNACIO DEXTER MRN/PID:          [id number]          Fellow: Accession#:       [id number]      Technologist:       Monserrat Moran RVMARILIN YOB: 1970         Technologist 2: Gender:           M                 Encounter#:         [id number] Admission Status: Inpatient         Location Performed: [facility]  Diagnosis/ICD: Syncope and collapse-R55 CPT Codes:     83631 Cerebrovascular Carotid Duplex scan complete  PRELIMINARY  CONCLUSIONS:  Right Carotid: Findings are consistent with less than 50% stenosis of the right proximal internal carotid artery. Laminar flow seen by color Doppler. Right external carotid artery appears patent with no evidence of stenosis. The right vertebral artery is patent with antegrade flow. No evidence of hemodynamically significant stenosis in the right subclavian artery. Left Carotid: Findings are consistent with less than 50% stenosis of the left proximal internal carotid artery. Left external carotid artery appears patent with no evidence of stenosis. The left vertebral artery is patent with antegrade flow. No evidence of hemodynamically significant stenosis in the left subclavian artery. Additional Findings: Technically difficult exam due to patient's mental status, patient's positioning, and deep inspiration.  Imaging & Doppler Findings: Right Plaque Morph: The proximal right internal carotid artery demonstrates heterogenous plaque. The distal right common carotid artery demonstrates heterogenous plaque. Left Plaque Morph: The proximal left internal carotid artery demonstrates heterogenous plaque. The proximal left external carotid artery demonstrates heterogenous plaque. The distal left common carotid artery demonstrates heterogenous plaque.   Right                        Left   PSV      EDV                PSV      EDV 92 cm/s            CCA P    121 cm/s 82 cm/s            CCA D    71 cm/s 56 cm/s  18 cm/s   ICA P    55 cm/s  17 cm/s 55 cm/s  20 cm/s   ICA M    55 cm/s  21 cm/s 59 cm/s  19 cm/s   ICA D    72 cm/s  24 cm/s 85 cm/s             ECA     130 cm/s 46 cm/s  7 cm/s  Vertebral  56 cm/s  23 cm/s 131 cm/s         Subclavian 109 cm/s                Right Left ICA/CCA Ratio  0.7  0.8   VASCULAR PRELIMINARY REPORT completed by Monserrat Moran MARILIN on 2/26/2024 at 4:13:08 PM  ** Final **      Scheduled medications  amLODIPine, 10 mg, oral, Daily  haloperidol lactate, 10 mg, intramuscular,  BID  melatonin, 6 mg, oral, Nightly  metoprolol tartrate, 50 mg, oral, BID  oseltamivir, 75 mg, oral, BID  perflutren lipid microspheres, 0.5-10 mL of dilution, intravenous, Once in imaging  perflutren protein A microsphere, 0.5 mL, intravenous, Once in imaging  risperiDONE, 1 mg, oral, BID  sulfur hexafluoride microsphr, 2 mL, intravenous, Once in imaging  valproate sodium, 500 mg, intravenous, q6h      Continuous medications     PRN medications  PRN medications: acetaminophen, diphenhydrAMINE, ibuprofen, labetaloL, nicotine polacrilex    ASSESSMENT:  Fall  Facial trauma - right eye brow laceration, repair  Altered mental status  Agitation/delirium  Schizophrenia  Alcohol dependence  Influenza A  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  No new issues.  Psychiatry following, input appreciated.  Continue medications per recommendations - Haldol, Risperidone to substitute for Invega monthly injectable, Depakote.  P.r.n Haldol, Benadryl.  Monitor Depakote levels.  Continue sitter.  Encourage compliance with medications and treatment.  Per Psychiatry, patient currently meets criteria for inpatient Psychiatric admission.  Respiratory status, pulse oximetry remain stable on room air.  Continue Tamiflu x 5 days.  Droplet isolation precautions.  Tobacco cessation education.  Nicorette gum.  Candida auris colonization positive screen on 2/7/2024 at [facility].  No active infection.  Contact plus isolation per hospital protocol.  Fall precautions.  DVT prophylaxis.  Ambulating.  Supportive care.  Patient reassured.  Case management and Psychiatry following.  Per Psychiatry, patient currently meets criteria for inpatient Psychiatric admission.  Discussed with Dr. Hernandez.  Patient is stable and medically cleared for discharge to inpatient psychiatric facility when arrangements are made.     Jennie Cannon, APRN-CNP

## 2024-02-27 NOTE — PROGRESS NOTES
Occupational Therapy                 Therapy Communication Note    Patient Name: Delta Phoenix  MRN: [id number]  Today's Date: 2/27/2024     Discipline: Occupational Therapy    Missed Visit Reason: Other (Comment) (OT screen completed. Per nurse, pt up ad nathanael independently in the room and pt has no concerns with self-care or functional mobility. Pt will eventually be admitted to an inpatient psych facility when medically cleared. Defer OT eval.)

## 2024-02-27 NOTE — CARE PLAN
Problem: Pain  Goal: My pain/discomfort is manageable  Outcome: Progressing   The patient's goals for the shift include na    The clinical goals for the shift include Fall precaution, re-orientation to place and situation.

## 2024-02-28 VITALS
BODY MASS INDEX: 22.1 KG/M2 | SYSTOLIC BLOOD PRESSURE: 129 MMHG | HEIGHT: 72 IN | RESPIRATION RATE: 17 BRPM | OXYGEN SATURATION: 92 % | WEIGHT: 163.14 LBS | HEART RATE: 52 BPM | TEMPERATURE: 98.4 F | DIASTOLIC BLOOD PRESSURE: 73 MMHG
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
## 2024-02-28 PROBLEM — R41.82 ALTERED MENTAL STATUS, UNSPECIFIED ALTERED MENTAL STATUS TYPE: Status: RESOLVED | Noted: 2024-02-23 | Resolved: 2024-02-28

## 2024-02-28 PROBLEM — R55 SYNCOPE: Status: RESOLVED | Noted: 2024-02-28 | Resolved: 2024-02-28

## 2024-02-28 PROBLEM — R55 SYNCOPE: Status: ACTIVE | Noted: 2024-02-28

## 2024-02-28 PROBLEM — S01.411A: Status: ACTIVE | Noted: 2024-02-28

## 2024-02-28 PROBLEM — R41.0 DISORIENTATION: Status: RESOLVED | Noted: 2024-02-28 | Resolved: 2024-02-28

## 2024-02-28 PROBLEM — F17.200 TOBACCO DEPENDENCE: Status: ACTIVE | Noted: 2024-02-28

## 2024-02-28 PROBLEM — F20.89 OTHER SCHIZOPHRENIA (MULTI): Status: ACTIVE | Noted: 2024-02-28

## 2024-02-28 PROBLEM — R41.82 ALTERED MENTAL STATUS: Status: RESOLVED | Noted: 2024-02-23 | Resolved: 2024-02-28

## 2024-02-28 PROBLEM — R41.0 DISORIENTATION: Status: ACTIVE | Noted: 2024-02-28

## 2024-02-28 PROCEDURE — 99232 SBSQ HOSP IP/OBS MODERATE 35: CPT

## 2024-02-28 PROCEDURE — 2500000002 HC RX 250 W HCPCS SELF ADMINISTERED DRUGS (ALT 637 FOR MEDICARE OP, ALT 636 FOR OP/ED)

## 2024-02-28 PROCEDURE — 2500000004 HC RX 250 GENERAL PHARMACY W/ HCPCS (ALT 636 FOR OP/ED)

## 2024-02-28 PROCEDURE — 2500000001 HC RX 250 WO HCPCS SELF ADMINISTERED DRUGS (ALT 637 FOR MEDICARE OP)

## 2024-02-28 PROCEDURE — 2500000005 HC RX 250 GENERAL PHARMACY W/O HCPCS

## 2024-02-28 PROCEDURE — 2500000001 HC RX 250 WO HCPCS SELF ADMINISTERED DRUGS (ALT 637 FOR MEDICARE OP): Performed by: NURSE PRACTITIONER

## 2024-02-28 RX ORDER — OSELTAMIVIR PHOSPHATE 75 MG/1
75 CAPSULE ORAL 2 TIMES DAILY
Qty: 10 CAPSULE | Refills: 0 | Status: CANCELLED
Start: 2024-02-28 | End: 2024-03-04

## 2024-02-28 RX ORDER — ACETAMINOPHEN 325 MG/1
975 TABLET ORAL EVERY 6 HOURS PRN
Qty: 30 TABLET | Refills: 0
Start: 2024-02-28

## 2024-02-28 RX ORDER — OSELTAMIVIR PHOSPHATE 75 MG/1
75 CAPSULE ORAL 2 TIMES DAILY
Qty: 4 CAPSULE | Refills: 0
Start: 2024-02-28 | End: 2024-03-01

## 2024-02-28 RX ORDER — MICONAZOLE NITRATE 2 %
2 CREAM (GRAM) TOPICAL EVERY 4 HOURS PRN
Start: 2024-02-28

## 2024-02-28 RX ADMIN — IBUPROFEN 400 MG: 400 TABLET, FILM COATED ORAL at 14:46

## 2024-02-28 RX ADMIN — AMLODIPINE BESYLATE 10 MG: 10 TABLET ORAL at 08:33

## 2024-02-28 RX ADMIN — ACETAMINOPHEN 975 MG: 325 TABLET ORAL at 08:33

## 2024-02-28 RX ADMIN — NICOTINE POLACRILEX 2 MG: 2 GUM, CHEWING BUCCAL at 14:46

## 2024-02-28 RX ADMIN — DEXTROSE MONOHYDRATE 500 MG: 50 INJECTION, SOLUTION INTRAVENOUS at 08:38

## 2024-02-28 RX ADMIN — METOPROLOL TARTRATE 50 MG: 50 TABLET ORAL at 08:34

## 2024-02-28 RX ADMIN — NICOTINE POLACRILEX 2 MG: 2 GUM, CHEWING BUCCAL at 08:34

## 2024-02-28 RX ADMIN — RISPERIDONE 1 MG: 1 TABLET, FILM COATED ORAL at 08:34

## 2024-02-28 RX ADMIN — DIPHENHYDRAMINE HYDROCHLORIDE 25 MG: 50 INJECTION, SOLUTION INTRAMUSCULAR; INTRAVENOUS at 06:11

## 2024-02-28 RX ADMIN — OSELTAMIVIR PHOSPHATE 75 MG: 75 CAPSULE ORAL at 08:42

## 2024-02-28 RX ADMIN — HALOPERIDOL LACTATE 10 MG: 5 INJECTION, SOLUTION INTRAMUSCULAR at 08:33

## 2024-02-28 ASSESSMENT — ENCOUNTER SYMPTOMS
MYALGIAS: 1
ACTIVITY CHANGE: 1
AGITATION: 0
FATIGUE: 1
WEAKNESS: 1
NERVOUS/ANXIOUS: 1
SLEEP DISTURBANCE: 1
DYSPHORIC MOOD: 1
HYPERACTIVE: 1
DECREASED CONCENTRATION: 0
HALLUCINATIONS: 0
CONFUSION: 0
ARTHRALGIAS: 1

## 2024-02-28 ASSESSMENT — PAIN - FUNCTIONAL ASSESSMENT: PAIN_FUNCTIONAL_ASSESSMENT: 0-10

## 2024-02-28 ASSESSMENT — PAIN SCALES - GENERAL: PAINLEVEL_OUTOF10: 4

## 2024-02-28 NOTE — PROGRESS NOTES
02/28/24 0802   Discharge Planning   Home or Post Acute Services Other (Comment)   Patient expects to be discharged to: Pt will return to [facility] inpatient psych   Does the patient need discharge transport arranged? Yes       Psych and crisis following for dc planning back to [facility].    1220-Pt daughter/guardian called concerned that pt was being dc back to his Group Home today. MSW updated her that pt will be returning to [facility] today. Dtr was agreeable to that decision.

## 2024-02-28 NOTE — PROGRESS NOTES
Patient accepted to [facility] 1500 unit by Dr. Jackson. Nursing report number [phone]. Patient needs to have IV removed prior to transfer.

## 2024-02-28 NOTE — CARE PLAN
The patient's goals for the shift include safety    The clinical goals for the shift include maintain safety

## 2024-02-28 NOTE — PROGRESS NOTES
Delta Phoenix is a 53 y.o. male on day 5 of admission presenting with Altered mental status.    Subjective   Patient seen and examined.  Resting in bed in no acute distress.  Awake alert oriented x 3.  No complaints.  No shortness of breath, cough.  Slept well.  Mood good.  No depression, anxiety, SI/HI, hallucinations.      Spoke with nursing, refused Depakote.    Objective   Sitter    Physical Exam  Vitals reviewed.   Constitutional:       General: He is not in acute distress.     Appearance: Normal appearance. He is normal weight. He is not ill-appearing or toxic-appearing.   HENT:      Head: Normocephalic and atraumatic.      Right Ear: Tympanic membrane normal.      Left Ear: Tympanic membrane normal.      Nose: Nose normal.      Mouth/Throat:      Mouth: Mucous membranes are moist.      Pharynx: Oropharynx is clear.   Eyes:      Extraocular Movements: Extraocular movements intact.      Conjunctiva/sclera: Conjunctivae normal.      Pupils: Pupils are equal, round, and reactive to light.   Cardiovascular:      Rate and Rhythm: Normal rate and regular rhythm.      Pulses: Normal pulses.      Heart sounds: Normal heart sounds.   Pulmonary:      Effort: Pulmonary effort is normal. No respiratory distress.      Breath sounds: Normal breath sounds. No wheezing, rhonchi or rales.      Comments: Room air.  Abdominal:      General: Bowel sounds are normal. There is no distension.      Palpations: Abdomen is soft.      Tenderness: There is no abdominal tenderness.   Genitourinary:     Comments: Deferred.  Musculoskeletal:         General: No swelling or tenderness. Normal range of motion.      Cervical back: Normal range of motion and neck supple.   Skin:     General: Skin is warm and dry.      Capillary Refill: Capillary refill takes less than 2 seconds.      Comments: Right eyebrow laceration sutures in place.  Abdominal scar healed.   Neurological:      General: No focal deficit present.      Mental Status: He is  "alert and oriented to person, place, and time.      Comments: Confused.   Psychiatric:         Mood and Affect: Mood is anxious. Affect is flat.         Behavior: Behavior is not hyperactive. Behavior is cooperative.       Last Recorded Vitals  Blood pressure 129/73, pulse 52, temperature 36.9 °C (98.4 °F), temperature source Oral, resp. rate 17, height 1.829 m (6' 0.01\"), weight 74 kg (163 lb 2.3 oz), SpO2 92 %.    Intake/Output last 3 Shifts:  I/O last 3 completed shifts:  In: 1365 (18.4 mL/kg) [P.O.:1365]  Out: - (0 mL/kg)   Weight: 74 kg     Relevant Results  No results found for this or any previous visit (from the past 24 hour(s)).    No results found.    Scheduled medications  amLODIPine, 10 mg, oral, Daily  haloperidol lactate, 10 mg, intramuscular, BID  melatonin, 6 mg, oral, Nightly  metoprolol tartrate, 50 mg, oral, BID  oseltamivir, 75 mg, oral, BID  perflutren lipid microspheres, 0.5-10 mL of dilution, intravenous, Once in imaging  perflutren protein A microsphere, 0.5 mL, intravenous, Once in imaging  risperiDONE, 1 mg, oral, BID  sulfur hexafluoride microsphr, 2 mL, intravenous, Once in imaging  valproate sodium, 500 mg, intravenous, q6h      Continuous medications     PRN medications  PRN medications: acetaminophen, diphenhydrAMINE, ibuprofen, labetaloL, nicotine polacrilex    ASSESSMENT:  Fall  Facial trauma - right eye brow laceration, repair  Altered mental status  Agitation/delirium  Schizophrenia  Alcohol dependence  Influenza A  Candida 

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
## 2024-02-28 NOTE — DISCHARGE INSTRUCTIONS
If you have any questions, please contact Dr. Hernandez's office at [phone].     Right eye brow laceration suture removal in 7 days.     Stop smoking.    **Continue Invega Sustenna monthly injection as prescribed.**    **Droplet precautions (Influenza), Contact plus precautions (Candida Auris).

## 2024-02-28 NOTE — CARE PLAN
The patient's goals for the shift include na    The clinical goals for the shift include Maintain Patient Safety, Transfer to  facility        Problem: Pain  Goal: My pain/discomfort is manageable  Outcome: Progressing     Problem: Safety  Goal: Patient will be injury free during hospitalization  Outcome: Progressing  Goal: I will remain free of falls  Outcome: Progressing     Problem: Daily Care  Goal: Daily care needs are met  Outcome: Progressing     Problem: Psychosocial Needs  Goal: Demonstrates ability to cope with hospitalization/illness  Outcome: Progressing  Goal: Collaborate with me, my family, and caregiver to identify my specific goals  Outcome: Progressing     Problem: Discharge Barriers  Goal: My discharge needs are met  Outcome: Progressing     Problem: Skin  Goal: Decreased wound size/increased tissue granulation at next dressing change  Outcome: Progressing  Goal: Participates in plan/prevention/treatment measures  Outcome: Progressing  Goal: Prevent/manage excess moisture  Outcome: Progressing  Goal: Prevent/minimize sheer/friction injuries  Outcome: Progressing  Goal: Promote/optimize nutrition  Outcome: Progressing  Goal: Promote skin healing  Outcome: Progressing     Problem: Skin  Goal: Decreased wound size/increased tissue granulation at next dressing change  Outcome: Progressing  Goal: Participates in plan/prevention/treatment measures  Outcome: Progressing  Goal: Prevent/manage excess moisture  Outcome: Progressing  Goal: Prevent/minimize sheer/friction injuries  Outcome: Progressing  Goal: Promote/optimize nutrition  Outcome: Progressing  Goal: Promote skin healing  Outcome: Progressing     Problem: Fall/Injury  Goal: Not fall by end of shift  Outcome: Progressing  Goal: Be free from injury by end of the shift  Outcome: Progressing  Goal: Verbalize understanding of personal risk factors for fall in the hospital  Outcome: Progressing  Goal: Verbalize understanding of risk factor reduction  measures to prevent injury from fall in the home  Outcome: Progressing  Goal: Use assistive devices by end of the shift  Outcome: Progressing  Goal: Pace activities to prevent fatigue by end of the shift  Outcome: Progressing

## 2024-02-28 NOTE — PROGRESS NOTES
"Physical Therapy                 Therapy Communication Note    Patient Name: Delta Phoenix  MRN: [id number]  Today's Date: 2/28/2024     Discipline: Physical Therapy    Missed Visit Reason: Missed Visit Reason: Patient refused (pt refused any/all PT interventiontoday due to \" fatigue.\" PT provided encouragement and education however pt continued to refuse.)    Missed Time: Attempt    Comment:  "

## 2024-02-28 NOTE — DISCHARGE SUMMARY
Discharge Diagnosis  Fall  Facial trauma - right eye brow laceration, repair  Altered mental status  Agitation/delirium  Schizophrenia  Alcohol dependence  Influenza A  Candida auris colonization positive screen  Abdominal hernia - repair - stable  Tobacco use    Issues Requiring Follow-Up  Above    Discharge Meds     Your medication list        START taking these medications        Instructions Last Dose Given Next Dose Due   acetaminophen 325 mg tablet  Commonly known as: Tylenol      Take 3 tablets (975 mg) by mouth every 6 hours if needed for mild pain (1 - 3).       nicotine polacrilex 2 mg gum  Commonly known as: Nicorette      Chew 1 each (2 mg) every 4 hours if needed for smoking cessation.       oseltamivir 75 mg capsule  Commonly known as: Tamiflu      Take 1 capsule (75 mg) by mouth 2 times a day for 2 days.              CONTINUE taking these medications        Instructions Last Dose Given Next Dose Due   amLODIPine 5 mg tablet  Commonly known as: Norvasc           haloperidol 10 mg tablet  Commonly known as: Haldol           melatonin 3 mg tablet           metoprolol tartrate 50 mg tablet  Commonly known as: Lopressor                  STOP taking these medications      benztropine 1 mg tablet  Commonly known as: Cogentin        clotrimazole 1 % cream  Commonly known as: Lotrimin        nicotine 21 mg/24 hr patch  Commonly known as: Nicoderm CQ        valproate 250 mg/5 mL oral solution  Commonly known as: Depakene                  Where to Get Your Medications        Information about where to get these medications is not yet available    Ask your nurse or doctor about these medications  acetaminophen 325 mg tablet  nicotine polacrilex 2 mg gum  oseltamivir 75 mg capsule         Test Results Pending At Discharge  Pending Labs       Order Current Status    Extra Urine Kennedy Tube Collected (02/23/24 1058)    Urinalysis with Reflex Culture and Microscopic In process          Hospital Course  This is a very  pleasant 53-year-old  male who presented to the emergency department with a fall, syncopal episode versus mechanical fall.  In the emergency department, he presented with a laceration to the right eyebrow, sutured.  Tetanus was administered.  CT head without contrast and CT cervical spine without contrast per radiology showed no acute findings.  He was combative and violent in the emergency department.  Restraints were applied for safety.  He was treated with medications and was admitted to the intensive care unit for further management.  He was evaluated and treated by Neurology and Psychiatry.  Per Psychiatry, he meets criteria for inpatient psychiatric treatment.  He was diagnosed with influenza A.  He was treated with Tamiflu and droplet precautions.  He was treated with Nicotine replacement and advised tobacco cessation.  His condition improved.  He was evaluated by physical, occupational therapy.  Case management was consulted for discharge planning.  He is stable for discharge to inpatient psychiatric treatment facility per Psychiatry recommendations.    Pertinent Physical Exam At Time of Discharge  See physical examination    Outpatient Follow-Up  No future appointments.    Follow-up with primary care provider.    Jennie Cannon, ANAMARIA-CNP